# Patient Record
Sex: FEMALE | Race: WHITE | URBAN - METROPOLITAN AREA
[De-identification: names, ages, dates, MRNs, and addresses within clinical notes are randomized per-mention and may not be internally consistent; named-entity substitution may affect disease eponyms.]

---

## 2017-02-02 ENCOUNTER — ONCOLOGY VISIT (OUTPATIENT)
Dept: ONCOLOGY | Facility: CLINIC | Age: 47
End: 2017-02-02
Attending: INTERNAL MEDICINE
Payer: COMMERCIAL

## 2017-02-02 ENCOUNTER — HOSPITAL ENCOUNTER (OUTPATIENT)
Facility: CLINIC | Age: 47
Setting detail: SPECIMEN
Discharge: HOME | End: 2017-02-02
Attending: INTERNAL MEDICINE | Admitting: INTERNAL MEDICINE
Payer: COMMERCIAL

## 2017-02-02 VITALS
HEART RATE: 68 BPM | OXYGEN SATURATION: 97 % | RESPIRATION RATE: 16 BRPM | DIASTOLIC BLOOD PRESSURE: 74 MMHG | TEMPERATURE: 97.6 F | BODY MASS INDEX: 31.48 KG/M2 | SYSTOLIC BLOOD PRESSURE: 116 MMHG | WEIGHT: 184.38 LBS | HEIGHT: 64 IN

## 2017-02-02 DIAGNOSIS — C50.411 MALIGNANT NEOPLASM OF UPPER-OUTER QUADRANT OF RIGHT FEMALE BREAST (H): ICD-10-CM

## 2017-02-02 LAB
ALBUMIN SERPL-MCNC: 3.8 G/DL (ref 3.4–5)
ALP SERPL-CCNC: 69 U/L (ref 40–150)
ALT SERPL W P-5'-P-CCNC: 29 U/L (ref 0–50)
ANION GAP SERPL CALCULATED.3IONS-SCNC: 5 MMOL/L (ref 3–14)
AST SERPL W P-5'-P-CCNC: 24 U/L (ref 0–45)
BILIRUB SERPL-MCNC: 0.7 MG/DL (ref 0.2–1.3)
BUN SERPL-MCNC: 16 MG/DL (ref 7–30)
CALCIUM SERPL-MCNC: 8.8 MG/DL (ref 8.5–10.1)
CANCER AG27-29 SERPL-ACNC: 19 U/ML (ref 0–39)
CHLORIDE SERPL-SCNC: 105 MMOL/L (ref 94–109)
CO2 SERPL-SCNC: 30 MMOL/L (ref 20–32)
CREAT SERPL-MCNC: 0.77 MG/DL (ref 0.52–1.04)
ERYTHROCYTE [DISTWIDTH] IN BLOOD BY AUTOMATED COUNT: 11.8 % (ref 10–15)
GFR SERPL CREATININE-BSD FRML MDRD: 81 ML/MIN/1.7M2
GLUCOSE SERPL-MCNC: 92 MG/DL (ref 70–99)
HCT VFR BLD AUTO: 41.1 % (ref 35–47)
HGB BLD-MCNC: 13.6 G/DL (ref 11.7–15.7)
MCH RBC QN AUTO: 28.3 PG (ref 26.5–33)
MCHC RBC AUTO-ENTMCNC: 33.1 G/DL (ref 31.5–36.5)
MCV RBC AUTO: 86 FL (ref 78–100)
PLATELET # BLD AUTO: 187 10E9/L (ref 150–450)
POTASSIUM SERPL-SCNC: 4 MMOL/L (ref 3.4–5.3)
PROT SERPL-MCNC: 7.4 G/DL (ref 6.8–8.8)
RBC # BLD AUTO: 4.8 10E12/L (ref 3.8–5.2)
SODIUM SERPL-SCNC: 140 MMOL/L (ref 133–144)
WBC # BLD AUTO: 5.4 10E9/L (ref 4–11)

## 2017-02-02 PROCEDURE — 99213 OFFICE O/P EST LOW 20 MIN: CPT | Performed by: INTERNAL MEDICINE

## 2017-02-02 PROCEDURE — 36415 COLL VENOUS BLD VENIPUNCTURE: CPT

## 2017-02-02 PROCEDURE — 86300 IMMUNOASSAY TUMOR CA 15-3: CPT | Performed by: INTERNAL MEDICINE

## 2017-02-02 PROCEDURE — 85027 COMPLETE CBC AUTOMATED: CPT | Performed by: INTERNAL MEDICINE

## 2017-02-02 PROCEDURE — 80053 COMPREHEN METABOLIC PANEL: CPT | Performed by: INTERNAL MEDICINE

## 2017-02-02 PROCEDURE — 99211 OFF/OP EST MAY X REQ PHY/QHP: CPT

## 2017-02-02 RX ORDER — TAMOXIFEN CITRATE 20 MG/1
20 TABLET ORAL DAILY
Qty: 90 TABLET | Refills: 1 | Status: SHIPPED | OUTPATIENT
Start: 2017-02-02 | End: 2017-11-02 | Stop reason: ALTCHOICE

## 2017-02-02 ASSESSMENT — PAIN SCALES - GENERAL: PAINLEVEL: NO PAIN (0)

## 2017-02-02 NOTE — PROGRESS NOTES
HISTORY OF PRESENT ILLNESS:  Ashleigh Gandhi, previously known as Ashleigh Jaimes, is a 46-year-old patient who comes in today for interim followup.  She was diagnosed with a T1 N1 tumor of the right breast in 05/2012 and she is here today for interim followup of that.  Ashleigh will be 5 years out from her diagnosis in 05/2017.  She actually had 2 small tumors in the right breast with microscopic disease in a sentinel lymph node, ER/AL positive, HER-2 receptor negative.  She finished off chemotherapy and has been on tamoxifen now for approximately 4-1/2 years.  She is due to stop the tamoxifen in September.  She is status post bilateral mastectomies.  She has been doing well on the tamoxifen.  We have talked about 5 years versus 10 years today and when she comes back to see me in the fall, we are going to consider switching her over to an aromatase inhibitor with Aromasin.  I have discussed this with her today.  She is in agreement with the plan and will get a bone density before she comes back.  Unfortunately, since I saw her last, she has got  but she seems to be very happy.  She works full-time as a  and she has a busy lifestyle.      REVIEW OF SYSTEMS:  A 14-point comprehensive review of systems today is otherwise unremarkable.  She does terrell with her weight and she is on an exercise program.      MEDICATIONS:  As charted.      ALLERGIES:  As charted.      PHYSICAL EXAMINATION:   VITAL SIGNS:  Stable.   HEENT:  Oropharynx clear, mucous membranes moist.   NECK:  Has no masses or goiter.   LYMPH:  There is no cervical, supraclavicular or infraclavicular adenopathy.   CHEST:  Clear to auscultation and percussion bilaterally.   HEART:  S1, S2 normal.  No added sounds or murmurs.   ABDOMEN:  Soft and nontender, no hepatosplenomegaly.   EXTREMITIES:  Legs are without tenderness or edema.   BREASTS:  The patient is status post bilateral mastectomies, scars look good.  Right and left axilla are negative.   No palpable masses on the chest wall.   SKIN:  Has no rashes or lesions.   LYMPHATIC:  No evidence of lymphedema.      DATA REVIEW:  Labs are pending at time of dictation.      ASSESSMENT AND PLAN:  The patient with a history of a T2 N1 right-sided breast cancer.  She had 2 small tumors on the right breast with microscopic disease in a sentinel lymph node, ER/RI positive.  She will finish out tamoxifen in the  of .  She will see me once she has finished the tamoxifen with a bone density test.  If her bone density test is decent, we will consider putting her on Aromasin.  I have had this discussion with her today and she is in agreement with the plan.         PJ DUPREE MD             D: 2017 14:20   T: 2017 14:34   MT: WILLI      Name:     DEREK GIORDANO   MRN:      [id number]        Account:      [id number]   :      1970           Visit Date:   2017      Document: [id number]

## 2017-02-02 NOTE — PROGRESS NOTES
"Ashleigh Gandhi is a 46 year old female who presents for:  Chief Complaint   Patient presents with     Oncology Clinic Visit     Follow up        Initial Vitals:  Ht 1.626 m (5' 4\")  Wt 83.632 kg (184 lb 6 oz)  BMI 31.63 kg/m2 Estimated body mass index is 31.63 kg/(m^2) as calculated from the following:    Height as of this encounter: 1.626 m (5' 4\").    Weight as of this encounter: 83.632 kg (184 lb 6 oz).. Body surface area is 1.94 meters squared. BP completed using cuff size: regular  No Pain (0) No LMP recorded. Allergies and medications reviewed.     Medications: Medication refills not needed today.  Pharmacy name entered into [employer]: [id number] [place], [place] - [facility]    Comments: Follow up    8 minutes for nursing intake (face to face time)   Ashleigh Rothman CMA     DISCHARGE PLAN:  Next appointments: See patient instruction section  Departure Mode: Ambulatory  Accompanied by: self  0 minutes for nursing discharge (face to face time)   Ashleigh Rothman CMA              "

## 2017-02-02 NOTE — MR AVS SNAPSHOT
After Visit Summary   2/2/2017    Ashleigh Gandhi    MRN: [id number]           Patient Information     Date Of Birth          1970        Visit Information        Provider Department      2/2/2017 12:30 PM Donald Garvin MD [facility]        Today's Diagnoses     Malignant neoplasm of upper-outer quadrant of right female breast (H)           Care Instructions    RTC MD  Oct 2017 Scheduled 9/13/17 Neida KEYS      Labs today-Ashleigh and on return     Bone density in Sept 2017   Scheduled 9/6/17 Neida KEYS  AVS given to patient          Follow-ups after your visit        Your next 10 appointments already scheduled     Sep 06, 2017 10:00 AM   DX HIP[facility]PELVIS[facility]SPINE [facility]    [address]  [address]  [facility] [address]              Please do not take any of the following 48 hours prior to your exam: vitamins, calcium tablets, antacids.            Sep 13, 2017 12:30 PM   Return Visit with Donald Garvin MD   [facility] ([facility])    [facility]  [address]  [address]  [facility] [id number] [phone]              Future tests that were ordered for you today     Open Future Orders        Priority Expected Expires Ordered    DX Hip/Pelvis/Spine Routine 9/13/2017 2/2/2018 2/2/2017            Who to contact     If you have questions or need follow up information about today's clinic visit or your schedule please contact [facility] directly at [phone].  Normal or non-critical lab and imaging results will be communicated to you by MyChart, letter or phone within 4 business days after the clinic has received the results. If you do not hear from us within 7 days, please contact the clinic through MyChart or phone. If you have a critical or abnormal lab result, we will notify you by phone as  "soon as possible.  Submit refill requests through [employer] or call your pharmacy and they will forward the refill request to us. Please allow 3 business days for your refill to be completed.          Additional Information About Your Visit        [employer] Information     Nobl lets you send messages to your doctor, view your test results, renew your prescriptions, schedule appointments and more. To sign up, go to [email or web address] . Click on \"Log in\" on the left side of the screen, which will take you to the Welcome page. Then click on \"Sign up Now\" on the right side of the page.     You will be asked to enter the access code listed below, as well as some personal information. Please follow the directions to create your username and password.     Your access code is: [email or web address]  Expires: 5/3/2017  1:44 PM     Your access code will  in 90 days. If you need help or a new code, please call your [place] clinic or [phone].        Care EveryWhere ID     This is your Care EveryWhere ID. This could be used by other organizations to access your [place] medical records  [id number]        Your Vitals Were     Pulse Temperature Respirations Height BMI (Body Mass Index) Pulse Oximetry    68 97.6  F (36.4  C) (Tympanic) 16 1.626 m (5' 4\") 31.63 kg/m2 97%       Blood Pressure from Last 3 Encounters:   17 116/74   11/23/15 108/69    Weight from Last 3 Encounters:   17 83.632 kg (184 lb 6 oz)   11/23/15 82.101 kg (181 lb)              We Performed the Following     Ca27.29  breast tumor marker     CBC with platelets     Comprehensive metabolic panel (BMP + Alb, Alk Phos, ALT, AST, Total. Bili, TP)          Today's Medication Changes          These changes are accurate as of: 17  1:44 PM.  If you have any questions, ask your nurse or doctor.               These medicines have changed or have updated prescriptions.        Dose/Directions    * TAMOXIFEN CITRATE PO   This may have changed:  Another " medication with the same name was added. Make sure you understand how and when to take each.        Dose:  10 mg   Take 10 mg by mouth daily   Refills:  0       * tamoxifen 20 MG tablet   Commonly known as:  NOLVADEX   This may have changed:  You were already taking a medication with the same name, and this prescription was added. Make sure you understand how and when to take each.   Used for:  Malignant neoplasm of upper-outer quadrant of right female breast (H)        Dose:  20 mg   Take 1 tablet (20 mg) by mouth daily   Quantity:  90 tablet   Refills:  1       * Notice:  This list has 2 medication(s) that are the same as other medications prescribed for you. Read the directions carefully, and ask your doctor or other care provider to review them with you.      Stop taking these medicines if you haven't already. Please contact your care team if you have questions.     cycloSPORINE 0.05 % ophthalmic emulsion   Commonly known as:  RESTASIS                Where to get your medicines      These medications were sent to [facility]/pharmacy #[id number] - [place], [place] - [address]  [address], [place] [address]     Phone:  [phone]    - tamoxifen 20 MG tablet             Primary Care Provider    None Specified       No primary provider on file.        Thank you!     Thank you for choosing [facility]  for your care. Our goal is always to provide you with excellent care. Hearing back from our patients is one way we can continue to improve our services. Please take a few minutes to complete the written survey that you may receive in the mail after your visit with us. Thank you!             Your Updated Medication List - Protect others around you: Learn how to safely use, store and throw away your medicines at www.disposemymeds.org.          This list is accurate as of: 2/2/17  1:44 PM.  Always use your most recent med list.                   Brand Name Dispense Instructions for use    *  TAMOXIFEN CITRATE PO      Take 10 mg by mouth daily       * tamoxifen 20 MG tablet    NOLVADEX    90 tablet    Take 1 tablet (20 mg) by mouth daily       * Notice:  This list has 2 medication(s) that are the same as other medications prescribed for you. Read the directions carefully, and ask your doctor or other care provider to review them with you.

## 2017-02-02 NOTE — PATIENT INSTRUCTIONS
RTC MD  Oct 2017 Scheduled 9/13/17 Neida KEYS      Labs today-Ashleigh and on return     Bone density in Sept 2017   Scheduled 9/6/17 Neida KEYS  AVS given to patient

## 2017-11-02 ENCOUNTER — ONCOLOGY VISIT (OUTPATIENT)
Dept: ONCOLOGY | Facility: CLINIC | Age: 47
End: 2017-11-02
Attending: INTERNAL MEDICINE
Payer: COMMERCIAL

## 2017-11-02 ENCOUNTER — HOSPITAL ENCOUNTER (OUTPATIENT)
Facility: CLINIC | Age: 47
Setting detail: SPECIMEN
Discharge: HOME | End: 2017-11-02
Attending: INTERNAL MEDICINE | Admitting: INTERNAL MEDICINE
Payer: COMMERCIAL

## 2017-11-02 VITALS
DIASTOLIC BLOOD PRESSURE: 81 MMHG | WEIGHT: 191.2 LBS | BODY MASS INDEX: 32.64 KG/M2 | TEMPERATURE: 98.5 F | HEIGHT: 64 IN | OXYGEN SATURATION: 99 % | SYSTOLIC BLOOD PRESSURE: 118 MMHG | HEART RATE: 69 BPM | RESPIRATION RATE: 16 BRPM

## 2017-11-02 DIAGNOSIS — C50.911 MALIGNANT NEOPLASM OF RIGHT BREAST IN FEMALE, ESTROGEN RECEPTOR POSITIVE, UNSPECIFIED SITE OF BREAST (H): Primary | ICD-10-CM

## 2017-11-02 DIAGNOSIS — Z17.0 MALIGNANT NEOPLASM OF RIGHT BREAST IN FEMALE, ESTROGEN RECEPTOR POSITIVE, UNSPECIFIED SITE OF BREAST (H): Primary | ICD-10-CM

## 2017-11-02 LAB
ALBUMIN SERPL-MCNC: 4.1 G/DL (ref 3.4–5)
ALP SERPL-CCNC: 88 U/L (ref 40–150)
ALT SERPL W P-5'-P-CCNC: 39 U/L (ref 0–50)
ANION GAP SERPL CALCULATED.3IONS-SCNC: 4 MMOL/L (ref 3–14)
AST SERPL W P-5'-P-CCNC: 24 U/L (ref 0–45)
BILIRUB SERPL-MCNC: 0.4 MG/DL (ref 0.2–1.3)
BUN SERPL-MCNC: 12 MG/DL (ref 7–30)
CALCIUM SERPL-MCNC: 9.4 MG/DL (ref 8.5–10.1)
CANCER AG27-29 SERPL-ACNC: 20 U/ML (ref 0–39)
CHLORIDE SERPL-SCNC: 101 MMOL/L (ref 94–109)
CO2 SERPL-SCNC: 34 MMOL/L (ref 20–32)
CREAT SERPL-MCNC: 0.78 MG/DL (ref 0.52–1.04)
ERYTHROCYTE [DISTWIDTH] IN BLOOD BY AUTOMATED COUNT: 11.9 % (ref 10–15)
GFR SERPL CREATININE-BSD FRML MDRD: 79 ML/MIN/1.7M2
GLUCOSE SERPL-MCNC: 95 MG/DL (ref 70–99)
HCT VFR BLD AUTO: 42.8 % (ref 35–47)
HGB BLD-MCNC: 14.6 G/DL (ref 11.7–15.7)
MCH RBC QN AUTO: 28.7 PG (ref 26.5–33)
MCHC RBC AUTO-ENTMCNC: 34.1 G/DL (ref 31.5–36.5)
MCV RBC AUTO: 84 FL (ref 78–100)
PLATELET # BLD AUTO: 211 10E9/L (ref 150–450)
POTASSIUM SERPL-SCNC: 4 MMOL/L (ref 3.4–5.3)
PROT SERPL-MCNC: 8.2 G/DL (ref 6.8–8.8)
RBC # BLD AUTO: 5.08 10E12/L (ref 3.8–5.2)
SODIUM SERPL-SCNC: 139 MMOL/L (ref 133–144)
WBC # BLD AUTO: 6 10E9/L (ref 4–11)

## 2017-11-02 PROCEDURE — 99214 OFFICE O/P EST MOD 30 MIN: CPT | Performed by: INTERNAL MEDICINE

## 2017-11-02 PROCEDURE — 85027 COMPLETE CBC AUTOMATED: CPT | Performed by: INTERNAL MEDICINE

## 2017-11-02 PROCEDURE — 86300 IMMUNOASSAY TUMOR CA 15-3: CPT | Performed by: INTERNAL MEDICINE

## 2017-11-02 PROCEDURE — 99211 OFF/OP EST MAY X REQ PHY/QHP: CPT

## 2017-11-02 PROCEDURE — 80053 COMPREHEN METABOLIC PANEL: CPT | Performed by: INTERNAL MEDICINE

## 2017-11-02 RX ORDER — EXEMESTANE 25 MG/1
25 TABLET ORAL DAILY
Qty: 90 TABLET | Refills: 3 | Status: SHIPPED | OUTPATIENT
Start: 2017-11-02 | End: 2017-11-22

## 2017-11-02 ASSESSMENT — PAIN SCALES - GENERAL: PAINLEVEL: MODERATE PAIN (4)

## 2017-11-02 NOTE — PROGRESS NOTES
DATE OF SERVICE:  11/02/2017      HISTORY OF PRESENT ILLNESS:  Ms. Ashleigh Gandhi is a 46-year-old patient who comes in today for interim followup.  She is here today for followup of a right-sided breast cancer as she was diagnosed with a T1, N1 right-sided breast cancer in 05/2012.  She is now 5 years out from that diagnosis.  She had 2 small tumors in the right breast with microscopic disease in a sentinel lymph node, ER/TX positive, HER-2 receptor negative.  She had chemotherapy and then 5 years of tamoxifen, which she is just about to stop.  She is status post bilateral mastectomies.  We have discussed options with her.  One option is continuing the tamoxifen for 10 years.  The other option is switching over to an aromatase inhibitor as she is postmenopausal with Aromasin and the other option is not doing anything further.  We have elected to start her on Aromasin.  We have discussed the risks, benefits and side effects today of the Aromasin.  She did have a bone density done which was good and I have reviewed the bone density with her today.  A comprehensive 14-point review of systems is otherwise unremarkable.  She continues to terrell with some weight and she is on an exercise program.  She works full-time as a .      MEDICATIONS:  As charted.      ALLERGIES:  As charted.      PHYSICAL EXAMINATION:   GENERAL:  She is well-appearing lady in no acute distress.   VITAL SIGNS:  Stable.   HEENT:  Oropharynx clear, mucous membranes moist.   NECK:  Has no masses or goiter.   LYMPH:  There is no cervical, supraclavicular or infraclavicular adenopathy.   CHEST:  Clear to auscultation and percussion bilaterally.   HEART:  S1, S2 normal.  No added sounds or murmurs.   ABDOMEN:  Soft and nontender, no hepatosplenomegaly.   EXTREMITIES:  Legs are without tenderness or edema.   BREASTS:  The patient is status post bilateral mastectomies, scars look good.  Right and left axillae are negative.  No palpable masses on the  chest wall.   SKIN:  Has no rashes or lesions.   LYMPHATIC:  No evidence of lymphedema.      DATA REVIEW:  Labs are pending at time of dictation.      IMPRESSION:  Patient with a history of a T2 N1 right-sided breast cancer.  She is finishing off tamoxifen in the next couple of days.  We want to start her on Aromasin.  I have discussed the risks, benefits and side effects of the Aromasin today and I have reviewed her bone density.  The patient understands the risk and is willing to proceed.  I will see her back for further review in 6 months.  She will call me if she has got any problems with the Aromasin.         PJ DUPREE MD             D: 2017 12:12   T: 2017 12:40   MT:       Name:     DEREK GIORDANO   MRN:      [id number]        Account:      [id number]   :      1970           Visit Date:   2017      Document: [id number]

## 2017-11-02 NOTE — MR AVS SNAPSHOT
"              After Visit Summary   11/2/2017    Ashleigh Gandhi    MRN: [id number]           Patient Information     Date Of Birth          1970        Visit Information        Provider Department      11/2/2017 10:30 AM Donald Garvin MD [facility]      Today's Diagnoses     Malignant neoplasm of right breast in female, estrogen receptor positive, unspecified site of breast (H)    -  1      Care Instructions      RTC MD   6 months       Labs today and on return  -Yasmeen            Follow-ups after your visit        Your next 10 appointments already scheduled     May 03, 2018 12:30 PM CDT   Return Visit with Donald Garvin MD   [facility] ([facility])    [facility]  [address]  [facility] [address]  [facility] [address] [phone]              Who to contact     If you have questions or need follow up information about today's clinic visit or your schedule please contact [facility] directly at [phone].  Normal or non-critical lab and imaging results will be communicated to you by LocalSensehart, letter or phone within 4 business days after the clinic has received the results. If you do not hear from us within 7 days, please contact the clinic through [employer] or phone. If you have a critical or abnormal lab result, we will notify you by phone as soon as possible.  Submit refill requests through [employer] or call your pharmacy and they will forward the refill request to us. Please allow 3 business days for your refill to be completed.          Additional Information About Your Visit        LocalSenseharBookShout! Information     [employer] lets you send messages to your doctor, view your test results, renew your prescriptions, schedule appointments and more. To sign up, go to [email or web address] . Click on \"Log in\" on the left side of the screen, which will take you to the Welcome page. " "Then click on \"Sign up Now\" on the right side of the page.     You will be asked to enter the access code listed below, as well as some personal information. Please follow the directions to create your username and password.     Your access code is: [email or web address]  Expires: 2018  6:31 AM     Your access code will  in 90 days. If you need help or a new code, please call your [place] clinic or [phone].        Care EveryWhere ID     This is your Care EveryWhere ID. This could be used by other organizations to access your [place] medical records  [id number]        Your Vitals Were     Pulse Temperature Respirations Height Pulse Oximetry BMI (Body Mass Index)    69 98.5  F (36.9  C) (Tympanic) 16 1.626 m (5' 4\") 99% 32.82 kg/m2       Blood Pressure from Last 3 Encounters:   17 118/81   17 116/74   11/23/15 108/69    Weight from Last 3 Encounters:   17 86.7 kg (191 lb 3.2 oz)   17 83.6 kg (184 lb 6 oz)   11/23/15 82.1 kg (181 lb)              We Performed the Following     Ca27.29  breast tumor marker     CBC with platelets     Comprehensive metabolic panel (BMP + Alb, Alk Phos, ALT, AST, Total. Bili, TP)          Today's Medication Changes          These changes are accurate as of: 17 11:34 AM.  If you have any questions, ask your nurse or doctor.               Start taking these medicines.        Dose/Directions    exemestane 25 MG tablet   Commonly known as:  AROMASIN   Used for:  Malignant neoplasm of right breast in female, estrogen receptor positive, unspecified site of breast (H)   Started by:  Donald Garvin MD        Dose:  25 mg   Take 1 tablet (25 mg) by mouth daily   Quantity:  90 tablet   Refills:  3         Stop taking these medicines if you haven't already. Please contact your care team if you have questions.     tamoxifen 20 MG tablet   Commonly known as:  NOLVADEX   Stopped by:  Donald Garvin MD                Where to get your medicines "      These medications were sent to [facility]/pharmacy #[id number] - [place], [place] - [address]  [address] [facility], [place] [address]     Phone:  [phone]     exemestane 25 MG tablet                Primary Care Provider Office Phone # Fax #    Soraya Coronado [phone]       [facility] [address] [address]        Equal Access to Services     JYOTI [facility] HospitalENRIQUETA : Hadii aad ku hadasho Soomaali, waaxda luqadaha, qaybta kaalmada adeegyada, waxay idiin hayaan adeeg khmainorsh lasherry . So [facility] [phone].    ATENCIÓN: Si jaymie guillen, tiene a bennett disposición servicios gratuitos de asistencia lingüística. [facility] [phone].    We comply with applicable federal civil rights laws and [place] laws. We do not discriminate on the basis of race, color, national origin, age, disability, sex, sexual orientation, or gender identity.            Thank you!     Thank you for choosing [facility]  for your care. Our goal is always to provide you with excellent care. Hearing back from our patients is one way we can continue to improve our services. Please take a few minutes to complete the written survey that you may receive in the mail after your visit with us. Thank you!             Your Updated Medication List - Protect others around you: Learn how to safely use, store and throw away your medicines at www.disposemymeds.org.          This list is accurate as of: 11/2/17 11:34 AM.  Always use your most recent med list.                   Brand Name Dispense Instructions for use Diagnosis    exemestane 25 MG tablet    AROMASIN    90 tablet    Take 1 tablet (25 mg) by mouth daily    Malignant neoplasm of right breast in female, estrogen receptor positive, unspecified site of breast (H)

## 2017-11-02 NOTE — LETTER
11/2/2017         RE: Ashleigh Gandhi  [address] [place] [address]        Dear Colleague,    Thank you for referring your patient, Ashleigh Gandhi, to the [facility]. Please see a copy of my visit note below.    DATE OF SERVICE:  11/02/2017      HISTORY OF PRESENT ILLNESS:  Ms. Ashleigh Gandhi is a 46-year-old patient who comes in today for interim followup.  She is here today for followup of a right-sided breast cancer as she was diagnosed with a T1, N1 right-sided breast cancer in 05/2012.  She is now 5 years out from that diagnosis.  She had 2 small tumors in the right breast with microscopic disease in a sentinel lymph node, ER/DE positive, HER-2 receptor negative.  She had chemotherapy and then 5 years of tamoxifen, which she is just about to stop.  She is status post bilateral mastectomies.  We have discussed options with her.  One option is continuing the tamoxifen for 10 years.  The other option is switching over to an aromatase inhibitor as she is postmenopausal with Aromasin and the other option is not doing anything further.  We have elected to start her on Aromasin.  We have discussed the risks, benefits and side effects today of the Aromasin.  She did have a bone density done which was good and I have reviewed the bone density with her today.  A comprehensive 14-point review of systems is otherwise unremarkable.  She continues to terrell with some weight and she is on an exercise program.  She works full-time as a .      MEDICATIONS:  As charted.      ALLERGIES:  As charted.      PHYSICAL EXAMINATION:   GENERAL:  She is well-appearing lady in no acute distress.   VITAL SIGNS:  Stable.   HEENT:  Oropharynx clear, mucous membranes moist.   NECK:  Has no masses or goiter.   LYMPH:  There is no cervical, supraclavicular or infraclavicular adenopathy.   CHEST:  Clear to auscultation and percussion bilaterally.   HEART:  S1, S2 normal.  No added sounds or murmurs.    ABDOMEN:  Soft and nontender, no hepatosplenomegaly.   EXTREMITIES:  Legs are without tenderness or edema.   BREASTS:  The patient is status post bilateral mastectomies, scars look good.  Right and left axillae are negative.  No palpable masses on the chest wall.   SKIN:  Has no rashes or lesions.   LYMPHATIC:  No evidence of lymphedema.      DATA REVIEW:  Labs are pending at time of dictation.      IMPRESSION:  Patient with a history of a T2 N1 right-sided breast cancer.  She is finishing off tamoxifen in the next couple of days.  We want to start her on Aromasin.  I have discussed the risks, benefits and side effects of the Aromasin today and I have reviewed her bone density.  The patient understands the risk and is willing to proceed.  I will see her back for further review in 6 months.  She will call me if she has got any problems with the Aromasin.         PJ GARVIN MD             D: 2017 12:12   T: 2017 12:40   MT:       Name:     DEREK GIORDANO   MRN:      [id number]        Account:      [id number]   :      1970           Visit Date:   2017      Document: [id number]       Again, thank you for allowing me to participate in the care of your patient.        Sincerely,        Pj Garvin MD

## 2017-11-02 NOTE — NURSING NOTE
"Oncology Rooming Note    November 2, 2017 10:22 AM   Ashleigh Gandhi is a 46 year old female who presents for:    Chief Complaint   Patient presents with     Oncology Clinic Visit     Follow-up     Initial Vitals: Ht 1.626 m (5' 4\")  Wt 86.7 kg (191 lb 3.2 oz)  BMI 32.82 kg/m2 Estimated body mass index is 32.82 kg/(m^2) as calculated from the following:    Height as of this encounter: 1.626 m (5' 4\").    Weight as of this encounter: 86.7 kg (191 lb 3.2 oz). Body surface area is 1.98 meters squared.  Moderate Pain (4) Comment: Low back and Left knee   No LMP recorded.  Allergies reviewed: Yes  Medications reviewed: Yes    Medications: Medication refills not needed today.  Pharmacy name entered into [employer]:    [id number] [place], [place] - [address] [facility]  [id number] - [place], [place] - [address]    Clinical concerns: Follow-up     8 minutes for nursing intake (face to face time)     DISCHARGE PLAN:  Next appointments: See patient instruction section  Departure Mode: Ambulatory  Accompanied by: self  0 minutes for nursing discharge (face to face time)   Yasmeen Yarbrough                "

## 2017-11-09 ENCOUNTER — TELEPHONE (OUTPATIENT)
Dept: ONCOLOGY | Facility: CLINIC | Age: 47
End: 2017-11-09

## 2017-11-09 NOTE — TELEPHONE ENCOUNTER
Called patient and left voicemail message, letting her know that her recent Ca 27-29 tumor marker test came back normal per Dr. Garvin.

## 2017-11-22 ENCOUNTER — TELEPHONE (OUTPATIENT)
Dept: ONCOLOGY | Facility: CLINIC | Age: 47
End: 2017-11-22

## 2017-11-22 DIAGNOSIS — C50.911 MALIGNANT NEOPLASM OF RIGHT BREAST IN FEMALE, ESTROGEN RECEPTOR POSITIVE, UNSPECIFIED SITE OF BREAST (H): ICD-10-CM

## 2017-11-22 DIAGNOSIS — Z17.0 MALIGNANT NEOPLASM OF RIGHT BREAST IN FEMALE, ESTROGEN RECEPTOR POSITIVE, UNSPECIFIED SITE OF BREAST (H): ICD-10-CM

## 2017-11-22 RX ORDER — EXEMESTANE 25 MG/1
25 TABLET ORAL DAILY
Qty: 90 TABLET | Refills: 3 | Status: SHIPPED | OUTPATIENT
Start: 2017-11-22 | End: 2018-05-21

## 2017-11-22 NOTE — TELEPHONE ENCOUNTER
Pt calling in.  States that her Rx for exemestane from 11/2/2017 was sent in to [facility] pharmacy in [facility].  However, her insurance company states that she cannot fill this medication at the [facility] pharmacy.  Pt states that it must be filled by [facility] (phone [phone]).  Pt is requesting that the Rx be sent there.  Called [facility] pharmacy. They state that the Rx can actually be filled through [facility], their parent company.  Rx for exemestane has been sent to [facility] per previous orders from 11/2/2017.  Left a detailed message for pt with all of this information.  Call the clinic with any further questions or concerns.  Joyce Hernandes RN BSN

## 2017-11-30 ENCOUNTER — CARE COORDINATION (OUTPATIENT)
Dept: ONCOLOGY | Facility: CLINIC | Age: 47
End: 2017-11-30

## 2017-11-30 NOTE — PROGRESS NOTES
Received fax from [employer].  Requesting clarification as to why pt is taking an aromatase inhibitor when she is under the age of 50.  Per Dr Garvin, pt is post-menopausal, and it is appropriate for her to be taking an aromatase inhibitor. Form has been completed and faxed back to [employer] at [phone].  Form has been sent to [employer] for scanning.  Joyce Hernandes RN BSN

## 2017-12-01 ENCOUNTER — TELEPHONE (OUTPATIENT)
Dept: ONCOLOGY | Facility: CLINIC | Age: 47
End: 2017-12-01

## 2017-12-01 NOTE — TELEPHONE ENCOUNTER
Pt called and states that [facility] cancelled her Rx because they never received the information requested. They are requesting a new Rx sent electronically not faxed. Please advise.

## 2017-12-04 NOTE — TELEPHONE ENCOUNTER
Talked with Sarah at [facility].  Sarah states that they did cancel the Rx for exemestane because they did not receive a reply to their fax regarding the safety of someone under the age of 50 taking an aromatase inhibitor.   Advised Sarah that the requested information was faxed to their office on 11/30/2017.  Per Dr Garvin, pt is post-menopausal and it is very appropriate for her to be taking exemestane.    Sarah states that she will activate the Rx for exemestane that was cancelled right now.  Will be processed and shipped out in a few days. Sarah states that if pt needs the Rx expedited, pt can call [phone] to have it expedited.   Left a message for pt to call back to discuss.  Joyce Hernandes RN BSN

## 2017-12-05 NOTE — TELEPHONE ENCOUNTER
Pt notified. Pt states that she will call [facility] now to make sure that the Rx for exemestane is being processed and will be shipped out to her soon. Patient verbalized understanding and agreement with plan.  Pt was instructed to call the clinic with any questions, concerns, or worsening symptoms.   Joyce Hernandes RN BSN

## 2018-03-23 ENCOUNTER — TELEPHONE (OUTPATIENT)
Dept: ONCOLOGY | Facility: CLINIC | Age: 48
End: 2018-03-23

## 2018-03-26 NOTE — TELEPHONE ENCOUNTER
David in pharmacy states that he has talked to pt.  David is working with pt to review possible sources of assistance, which is pending at this time.    Joyce Hernandes RN BSN

## 2018-05-07 ENCOUNTER — CARE COORDINATION (OUTPATIENT)
Dept: ONCOLOGY | Facility: CLINIC | Age: 48
End: 2018-05-07

## 2018-05-07 NOTE — LETTER
"    May 7, 2018       TO: Ashleigh Oksana  [address] [place] [address]         Dear Ms. Gandhi,    We missed you at your last appointment at [facility]. We have several options to help you reschedule your appointment:      Call [phone]    Visit our website at [email or web address] and click \"I Want To\" (In Upper Right) and select Request an Appointment    Request an appointment via Last Guide at [email or web address]    We are committed to providing you with exceptional care and service. It's important that our patients can get appointments when they need them, so we ask that you make every effort to consistently attend scheduled appointments and give us notice when you need to cancel an appointment.    If financial concerns have kept you from your appointment, we want to help. Please call a financial counselor at [phone] to assist you.      Sincerely,      [facility]  "

## 2018-05-07 NOTE — PROGRESS NOTES
Pt did not come to her scheduled appt on 5/3/2018.  No show letter has been printed and mailed to pt.  Joyce Hernandes RN BSN

## 2018-05-21 DIAGNOSIS — C50.911 MALIGNANT NEOPLASM OF RIGHT BREAST IN FEMALE, ESTROGEN RECEPTOR POSITIVE, UNSPECIFIED SITE OF BREAST (H): ICD-10-CM

## 2018-05-21 DIAGNOSIS — Z17.0 MALIGNANT NEOPLASM OF RIGHT BREAST IN FEMALE, ESTROGEN RECEPTOR POSITIVE, UNSPECIFIED SITE OF BREAST (H): ICD-10-CM

## 2018-05-21 NOTE — TELEPHONE ENCOUNTER
Pending Prescriptions:                       Disp   Refills    exemestane (AROMASIN) 25 MG tablet        90 tab*3            Sig: Take 1 tablet (25 mg) by mouth daily         Last Written Prescription Date:  11/22/2017  Last Fill Quantity: 90,   # refills: 3  Last Office Visit: 11/2/2017  Future Office visit:   Pt had an appt scheduled with Dr Garvin on 5/3/2018, but she did not come to the appt.  No future appt scheduled.  Left a message for pt to call back to discuss.    Also talked with [facility].  They state that pt had the previous Rx from 11/22/2017 transferred to a different pharmacy, so they will need a new Rx.  Will await return call from pt.  Joyce Hernandes RN BSN

## 2018-05-23 RX ORDER — EXEMESTANE 25 MG/1
25 TABLET ORAL DAILY
Qty: 90 TABLET | Refills: 3 | Status: SHIPPED | OUTPATIENT
Start: 2018-05-23 | End: 2018-12-12 | Stop reason: SINTOL

## 2018-05-23 NOTE — TELEPHONE ENCOUNTER
Talked with pt.  Pt states that she wants a refill sent to [facility] for the exemestane.  Pt has met her deductible for her insurance, and states that she will be able to get the exemestane at no charge for the remainder of the year. However, pt states that she does want to discuss with Dr Garvin at her next appt to see if there would be a cheaper alternative for medication for next year. The exemestane costs $300 per month.  Pt also states that she missed her appt with Dr Garvin and would like to reschedule the appt.  Pt transferred to scheduling. Appt has been scheduled on 6/13/2018. Patient verbalized understanding and agreement with plan.  Pt was instructed to call the clinic with any questions, concerns, or worsening symptoms.  Will route to Dr Garvin for refill request.  Joyce Hernandes RN BSN

## 2018-05-23 NOTE — TELEPHONE ENCOUNTER
Signed Prescriptions:                        Disp   Refills    exemestane (AROMASIN) 25 MG tablet         90 tab*3        Sig: Take 1 tablet (25 mg) by mouth daily  Authorizing Provider: PJ GARVIN      Rx has been approved by Dr Garvin.  Joyce Hernandes RN BSN

## 2018-06-13 ENCOUNTER — HOSPITAL ENCOUNTER (OUTPATIENT)
Facility: CLINIC | Age: 48
Setting detail: SPECIMEN
Discharge: HOME | End: 2018-06-13
Attending: INTERNAL MEDICINE | Admitting: INTERNAL MEDICINE
Payer: COMMERCIAL

## 2018-06-13 ENCOUNTER — ONCOLOGY VISIT (OUTPATIENT)
Dept: ONCOLOGY | Facility: CLINIC | Age: 48
End: 2018-06-13
Attending: INTERNAL MEDICINE
Payer: COMMERCIAL

## 2018-06-13 VITALS
SYSTOLIC BLOOD PRESSURE: 108 MMHG | DIASTOLIC BLOOD PRESSURE: 72 MMHG | RESPIRATION RATE: 16 BRPM | TEMPERATURE: 97.8 F | WEIGHT: 190 LBS | BODY MASS INDEX: 32.44 KG/M2 | HEART RATE: 81 BPM | HEIGHT: 64 IN | OXYGEN SATURATION: 97 %

## 2018-06-13 DIAGNOSIS — Z17.0 MALIGNANT NEOPLASM OF UPPER-OUTER QUADRANT OF RIGHT BREAST IN FEMALE, ESTROGEN RECEPTOR POSITIVE (H): Primary | ICD-10-CM

## 2018-06-13 DIAGNOSIS — C50.411 MALIGNANT NEOPLASM OF UPPER-OUTER QUADRANT OF RIGHT BREAST IN FEMALE, ESTROGEN RECEPTOR POSITIVE (H): Primary | ICD-10-CM

## 2018-06-13 LAB
ALBUMIN SERPL-MCNC: 4.2 G/DL (ref 3.4–5)
ALP SERPL-CCNC: 101 U/L (ref 40–150)
ALT SERPL W P-5'-P-CCNC: 48 U/L (ref 0–50)
ANION GAP SERPL CALCULATED.3IONS-SCNC: 4 MMOL/L (ref 3–14)
AST SERPL W P-5'-P-CCNC: 22 U/L (ref 0–45)
BILIRUB SERPL-MCNC: 0.3 MG/DL (ref 0.2–1.3)
BUN SERPL-MCNC: 12 MG/DL (ref 7–30)
CALCIUM SERPL-MCNC: 9.2 MG/DL (ref 8.5–10.1)
CANCER AG27-29 SERPL-ACNC: 19 U/ML (ref 0–39)
CHLORIDE SERPL-SCNC: 105 MMOL/L (ref 94–109)
CO2 SERPL-SCNC: 30 MMOL/L (ref 20–32)
CREAT SERPL-MCNC: 0.86 MG/DL (ref 0.52–1.04)
ERYTHROCYTE [DISTWIDTH] IN BLOOD BY AUTOMATED COUNT: 12.1 % (ref 10–15)
GFR SERPL CREATININE-BSD FRML MDRD: 70 ML/MIN/1.7M2
GLUCOSE SERPL-MCNC: 96 MG/DL (ref 70–99)
HCT VFR BLD AUTO: 41.4 % (ref 35–47)
HGB BLD-MCNC: 13.8 G/DL (ref 11.7–15.7)
MCH RBC QN AUTO: 28.2 PG (ref 26.5–33)
MCHC RBC AUTO-ENTMCNC: 33.3 G/DL (ref 31.5–36.5)
MCV RBC AUTO: 85 FL (ref 78–100)
PLATELET # BLD AUTO: 222 10E9/L (ref 150–450)
POTASSIUM SERPL-SCNC: 4.2 MMOL/L (ref 3.4–5.3)
PROT SERPL-MCNC: 7.8 G/DL (ref 6.8–8.8)
RBC # BLD AUTO: 4.89 10E12/L (ref 3.8–5.2)
SODIUM SERPL-SCNC: 139 MMOL/L (ref 133–144)
WBC # BLD AUTO: 6.7 10E9/L (ref 4–11)

## 2018-06-13 PROCEDURE — 99213 OFFICE O/P EST LOW 20 MIN: CPT | Performed by: INTERNAL MEDICINE

## 2018-06-13 PROCEDURE — 80053 COMPREHEN METABOLIC PANEL: CPT | Performed by: INTERNAL MEDICINE

## 2018-06-13 PROCEDURE — G0463 HOSPITAL OUTPT CLINIC VISIT: HCPCS

## 2018-06-13 PROCEDURE — 86300 IMMUNOASSAY TUMOR CA 15-3: CPT | Performed by: INTERNAL MEDICINE

## 2018-06-13 PROCEDURE — 36415 COLL VENOUS BLD VENIPUNCTURE: CPT

## 2018-06-13 PROCEDURE — 85027 COMPLETE CBC AUTOMATED: CPT | Performed by: INTERNAL MEDICINE

## 2018-06-13 ASSESSMENT — PAIN SCALES - GENERAL: PAINLEVEL: NO PAIN (0)

## 2018-06-13 NOTE — MR AVS SNAPSHOT
"              After Visit Summary   6/13/2018    Ashleigh Gandhi    MRN: [id number]           Patient Information     Date Of Birth          1970        Visit Information        Provider Department      6/13/2018 8:30 AM Donald Garvin MD [facility]        Today's Diagnoses     Malignant neoplasm of upper-outer quadrant of right breast in female, estrogen receptor positive (H)    -  1      Care Instructions        RTC MD 6 months     Labs today - drawn ljt  And on return           Follow-ups after your visit        Your next 10 appointments already scheduled     Dec 12, 2018 10:30 AM CST   Return Visit with Donald Garvin MD   [facility] ([facility])    [facility]  [address]  [facility] [address]  [facility] [address] [phone]              Who to contact     If you have questions or need follow up information about today's clinic visit or your schedule please contact [facility] directly at [phone].  Normal or non-critical lab and imaging results will be communicated to you by RetAPPshart, letter or phone within 4 business days after the clinic has received the results. If you do not hear from us within 7 days, please contact the clinic through [employer] or phone. If you have a critical or abnormal lab result, we will notify you by phone as soon as possible.  Submit refill requests through [employer] or call your pharmacy and they will forward the refill request to us. Please allow 3 business days for your refill to be completed.          Additional Information About Your Visit        RetAPPshart Information     [employer] lets you send messages to your doctor, view your test results, renew your prescriptions, schedule appointments and more. To sign up, go to [email or web address] . Click on \"Log in\" on the left side of the screen, which will take you to the Welcome page. Then click on " "\"Sign up Now\" on the right side of the page.     You will be asked to enter the access code listed below, as well as some personal information. Please follow the directions to create your username and password.     Your access code is: [email or web address]  Expires: 2018  9:29 AM     Your access code will  in 90 days. If you need help or a new code, please call your [place] clinic or [phone].        Care EveryWhere ID     This is your Care EveryWhere ID. This could be used by other organizations to access your [place] medical records  [id number]        Your Vitals Were     Pulse Temperature Respirations Height Pulse Oximetry BMI (Body Mass Index)    81 97.8  F (36.6  C) (Tympanic) 16 1.626 m (5' 4\") 97% 32.61 kg/m2       Blood Pressure from Last 3 Encounters:   18 108/72   17 118/81   17 116/74    Weight from Last 3 Encounters:   18 86.2 kg (190 lb)   17 86.7 kg (191 lb 3.2 oz)   17 83.6 kg (184 lb 6 oz)              We Performed the Following     Ca27.29  breast tumor marker     CBC with platelets     Comprehensive metabolic panel (BMP + Alb, Alk Phos, ALT, AST, Total. Bili, TP)        Primary Care Provider Office Phone # Fax #    Soraya Coronado [phone]       [place] [address]        Equal Access to Services     JYOTI MURPHY : Hadii alysia staleyo Somelissa, waaxda luqadaha, qaybta kaalmada adetaina, danii hinson. So [facility] [phone].    ATENCIÓN: Si habla español, tiene a bennett disposición servicios gratuitos de asistencia lingüística. Llame al [phone].    We comply with applicable federal civil rights laws and [place] laws. We do not discriminate on the basis of race, color, national origin, age, disability, sex, sexual orientation, or gender identity.            Thank you!     Thank you for choosing [facility]  for your care. Our goal is always to " provide you with excellent care. Hearing back from our patients is one way we can continue to improve our services. Please take a few minutes to complete the written survey that you may receive in the mail after your visit with us. Thank you!             Your Updated Medication List - Protect others around you: Learn how to safely use, store and throw away your medicines at www.disposemymeds.org.          This list is accurate as of 6/13/18  9:43 AM.  Always use your most recent med list.                   Brand Name Dispense Instructions for use Diagnosis    exemestane 25 MG tablet    AROMASIN    90 tablet    Take 1 tablet (25 mg) by mouth daily    Malignant neoplasm of right breast in female, estrogen receptor positive, unspecified site of breast (H)

## 2018-06-13 NOTE — PROGRESS NOTES
Visit Date:   06/13/2018      HISTORY OF PRESENT ILLNESS:  Ashleigh Gandhi is a 47-year-old patient who comes in today for interim followup.  She has a history of a right-sided breast cancer in the upper outer quadrant of the right breast.  It was a T1c N1 ductal carcinoma of the right breast.  She had 2 small tumors in the right breast and microscopic disease in a sentinel lymph node.  The stage of disease was a T1 N1, stage II.  The tumor was ER/AR-positive, HER-2 receptor negative.  She completed mastectomies, chemotherapy and is now on adjuvant hormonal therapy with Aromasin.  She has done 5 years of adjuvant tamoxifen.  Aromasin is going well for her.  She really does not have anything much in the way of bony aches with it and nothing much in the way of hot flashes.  She is up-to-date on a bone density, which I have reviewed also today.  She feels well today.  She battles with weight gain, and we have discussed weight gain again in clinic today.  She continues on an exercise program.  She continues to work full-time as a .      MEDICATIONS AND ALLERGIES:  Outlined on the nursing records.        REVIEW OF SYSTEMS:  The rest of her 14-point comprehensive review of systems, apart from what is outlined above, is unremarkable.      PHYSICAL EXAMINATION:   GENERAL:  She is a well-appearing lady in no acute distress.   VITAL SIGNS:  Stable.   HEENT:  Oropharynx clear, mucous membranes moist.   NECK:  No masses or goiter.  There is no cervical, supraclavicular or infraclavicular adenopathy.   CHEST:  Clear to auscultation and percussion bilaterally.   HEART:  S1, S2 normal.  No added sounds or murmurs.   ABDOMEN:  Soft and nontender, no hepatosplenomegaly.   EXTREMITIES:  Legs are without tenderness or edema.   BREASTS:  The patient is status post bilateral mastectomies.  The scars look good.  Right and left axillae are negative.  No palpable masses on the chest wall.   SKIN:  No rashes or lesions.   LYMPHATIC:   No evidence of lymphedema.      DATA REVIEW:  I have ordered routine blood work today.      IMPRESSION:  Patient with a history of a node positive right-sided breast cancer in the upper outer quadrant.  It was an infiltrating ductal carcinoma.  She had 2 small tumors in the right breast with microscopic disease in the sentinel lymph node, ER/AR-positive, HER-2 receptor negative.  She has finished bilateral mastectomies, chemotherapy, adjuvant tamoxifen and is now on active treatment with adjuvant Aromasin.  She is tolerating it well without any major side effects.  We have discussed the weight gain issue in clinic today.  She struggles with being able to shift weight.  I will review her labs when they come in and I will be in contact with her if there is anything sinister in the labs.         PJ DUPREE MD             D: 2018   T: 2018   MT: DEEPTHI      Name:     DEREK GIORDANO   MRN:      [id number]        Account:      [id number]   :      1970           Visit Date:   2018      Document: [id number]

## 2018-06-13 NOTE — NURSING NOTE
"Oncology Rooming Note    June 13, 2018 8:45 AM   Ashleigh Gandhi is a 47 year old female who presents for:    Chief Complaint   Patient presents with     Oncology Clinic Visit     Malignant neoplasm of right breast in female,      Initial Vitals: /72  Pulse 81  Temp 97.8  F (36.6  C) (Tympanic)  Resp 16  Ht 1.626 m (5' 4\")  Wt 86.2 kg (190 lb)  SpO2 97%  BMI 32.61 kg/m2 Estimated body mass index is 32.61 kg/(m^2) as calculated from the following:    Height as of this encounter: 1.626 m (5' 4\").    Weight as of this encounter: 86.2 kg (190 lb). Body surface area is 1.97 meters squared.  No Pain (0) Comment: Data Unavailable   No LMP recorded.  Allergies reviewed: Yes  Medications reviewed: Yes    Medications: Medication refills not needed today.  Pharmacy name entered into [employer]:    [id number] [place], [place] - [address] [facility]  [id number] [place], [place] - [address] [facility] [email or web address] HOME DELIVERY - [address]    Clinical concerns: follow up      8 minutes for nursing intake (face to face time)     Ashleigh Rothman CMA     DISCHARGE PLAN:  Next appointments: See patient instruction section  Departure Mode: Ambulatory  Accompanied by: self  0 minutes for nursing discharge (face to face time)   Ashleigh Rothman CMA                  "

## 2018-06-13 NOTE — LETTER
6/13/2018         RE: Ashleigh Gandhi  [address] [place] [address]        Dear Colleague,    Thank you for referring your patient, Ashleigh Gandhi, to the [facility]. Please see a copy of my visit note below.    Visit Date:   06/13/2018      HISTORY OF PRESENT ILLNESS:  Ashleigh Gandhi is a 47-year-old patient who comes in today for interim followup.  She has a history of a right-sided breast cancer in the upper outer quadrant of the right breast.  It was a T1c N1 ductal carcinoma of the right breast.  She had 2 small tumors in the right breast and microscopic disease in a sentinel lymph node.  The stage of disease was a T1 N1, stage II.  The tumor was ER/KS-positive, HER-2 receptor negative.  She completed mastectomies, chemotherapy and is now on adjuvant hormonal therapy with Aromasin.  She has done 5 years of adjuvant tamoxifen.  Aromasin is going well for her.  She really does not have anything much in the way of bony aches with it and nothing much in the way of hot flashes.  She is up-to-date on a bone density, which I have reviewed also today.  She feels well today.  She battles with weight gain, and we have discussed weight gain again in clinic today.  She continues on an exercise program.  She continues to work full-time as a .      MEDICATIONS AND ALLERGIES:  Outlined on the nursing records.        REVIEW OF SYSTEMS:  The rest of her 14-point comprehensive review of systems, apart from what is outlined above, is unremarkable.      PHYSICAL EXAMINATION:   GENERAL:  She is a well-appearing lady in no acute distress.   VITAL SIGNS:  Stable.   HEENT:  Oropharynx clear, mucous membranes moist.   NECK:  No masses or goiter.  There is no cervical, supraclavicular or infraclavicular adenopathy.   CHEST:  Clear to auscultation and percussion bilaterally.   HEART:  S1, S2 normal.  No added sounds or murmurs.   ABDOMEN:  Soft and nontender, no hepatosplenomegaly.    EXTREMITIES:  Legs are without tenderness or edema.   BREASTS:  The patient is status post bilateral mastectomies.  The scars look good.  Right and left axillae are negative.  No palpable masses on the chest wall.   SKIN:  No rashes or lesions.   LYMPHATIC:  No evidence of lymphedema.      DATA REVIEW:  I have ordered routine blood work today.      IMPRESSION:  Patient with a history of a node positive right-sided breast cancer in the upper outer quadrant.  It was an infiltrating ductal carcinoma.  She had 2 small tumors in the right breast with microscopic disease in the sentinel lymph node, ER/AR-positive, HER-2 receptor negative.  She has finished bilateral mastectomies, chemotherapy, adjuvant tamoxifen and is now on active treatment with adjuvant Aromasin.  She is tolerating it well without any major side effects.  We have discussed the weight gain issue in clinic today.  She struggles with being able to shift weight.  I will review her labs when they come in and I will be in contact with her if there is anything sinister in the labs.         PJ GARVIN MD             D: 2018   T: 2018   MT: DEEPTHI      Name:     DEREK GIORDANO   MRN:      [id number]        Account:      [id number]   :      1970           Visit Date:   2018      Document: [id number]       Again, thank you for allowing me to participate in the care of your patient.        Sincerely,        Pj Garvin MD

## 2018-12-12 ENCOUNTER — HOSPITAL ENCOUNTER (OUTPATIENT)
Facility: CLINIC | Age: 48
Setting detail: SPECIMEN
Discharge: HOME | End: 2018-12-12
Attending: INTERNAL MEDICINE | Admitting: INTERNAL MEDICINE
Payer: COMMERCIAL

## 2018-12-12 ENCOUNTER — ONCOLOGY VISIT (OUTPATIENT)
Dept: ONCOLOGY | Facility: CLINIC | Age: 48
End: 2018-12-12
Attending: INTERNAL MEDICINE
Payer: COMMERCIAL

## 2018-12-12 VITALS
HEIGHT: 64 IN | WEIGHT: 194 LBS | OXYGEN SATURATION: 97 % | TEMPERATURE: 97.3 F | SYSTOLIC BLOOD PRESSURE: 122 MMHG | DIASTOLIC BLOOD PRESSURE: 81 MMHG | HEART RATE: 92 BPM | BODY MASS INDEX: 33.12 KG/M2 | RESPIRATION RATE: 16 BRPM

## 2018-12-12 DIAGNOSIS — Z17.0 MALIGNANT NEOPLASM OF RIGHT BREAST IN FEMALE, ESTROGEN RECEPTOR POSITIVE, UNSPECIFIED SITE OF BREAST (H): Primary | ICD-10-CM

## 2018-12-12 DIAGNOSIS — C50.911 MALIGNANT NEOPLASM OF RIGHT BREAST IN FEMALE, ESTROGEN RECEPTOR POSITIVE, UNSPECIFIED SITE OF BREAST (H): Primary | ICD-10-CM

## 2018-12-12 LAB
ALBUMIN SERPL-MCNC: 4 G/DL (ref 3.4–5)
ALP SERPL-CCNC: 99 U/L (ref 40–150)
ALT SERPL W P-5'-P-CCNC: 28 U/L (ref 0–50)
ANION GAP SERPL CALCULATED.3IONS-SCNC: 3 MMOL/L (ref 3–14)
AST SERPL W P-5'-P-CCNC: 21 U/L (ref 0–45)
BILIRUB SERPL-MCNC: 0.3 MG/DL (ref 0.2–1.3)
BUN SERPL-MCNC: 11 MG/DL (ref 7–30)
CALCIUM SERPL-MCNC: 9 MG/DL (ref 8.5–10.1)
CANCER AG27-29 SERPL-ACNC: 25 U/ML (ref 0–39)
CHLORIDE SERPL-SCNC: 107 MMOL/L (ref 94–109)
CO2 SERPL-SCNC: 30 MMOL/L (ref 20–32)
CREAT SERPL-MCNC: 0.75 MG/DL (ref 0.52–1.04)
ERYTHROCYTE [DISTWIDTH] IN BLOOD BY AUTOMATED COUNT: 11.9 % (ref 10–15)
GFR SERPL CREATININE-BSD FRML MDRD: 82 ML/MIN/1.7M2
GLUCOSE SERPL-MCNC: 98 MG/DL (ref 70–99)
HCT VFR BLD AUTO: 43.8 % (ref 35–47)
HGB BLD-MCNC: 14.5 G/DL (ref 11.7–15.7)
MCH RBC QN AUTO: 28.4 PG (ref 26.5–33)
MCHC RBC AUTO-ENTMCNC: 33.1 G/DL (ref 31.5–36.5)
MCV RBC AUTO: 86 FL (ref 78–100)
PLATELET # BLD AUTO: 212 10E9/L (ref 150–450)
POTASSIUM SERPL-SCNC: 4.3 MMOL/L (ref 3.4–5.3)
PROT SERPL-MCNC: 7.7 G/DL (ref 6.8–8.8)
RBC # BLD AUTO: 5.1 10E12/L (ref 3.8–5.2)
SODIUM SERPL-SCNC: 140 MMOL/L (ref 133–144)
WBC # BLD AUTO: 5.3 10E9/L (ref 4–11)

## 2018-12-12 PROCEDURE — 86300 IMMUNOASSAY TUMOR CA 15-3: CPT | Performed by: INTERNAL MEDICINE

## 2018-12-12 PROCEDURE — 36415 COLL VENOUS BLD VENIPUNCTURE: CPT

## 2018-12-12 PROCEDURE — 80053 COMPREHEN METABOLIC PANEL: CPT | Performed by: INTERNAL MEDICINE

## 2018-12-12 PROCEDURE — 99214 OFFICE O/P EST MOD 30 MIN: CPT | Performed by: INTERNAL MEDICINE

## 2018-12-12 PROCEDURE — 85027 COMPLETE CBC AUTOMATED: CPT | Performed by: INTERNAL MEDICINE

## 2018-12-12 PROCEDURE — G0463 HOSPITAL OUTPT CLINIC VISIT: HCPCS

## 2018-12-12 RX ORDER — ANASTROZOLE 1 MG/1
1 TABLET ORAL DAILY
Qty: 90 TABLET | Refills: 3 | Status: SHIPPED | OUTPATIENT
Start: 2018-12-12 | End: 2019-12-16

## 2018-12-12 ASSESSMENT — MIFFLIN-ST. JEOR: SCORE: 1489.98

## 2018-12-12 ASSESSMENT — PAIN SCALES - GENERAL: PAINLEVEL: MODERATE PAIN (4)

## 2018-12-12 NOTE — LETTER
12/12/2018         RE: Ashleigh Gandhi  [address] [place] [address]        Dear Colleague,    Thank you for referring your patient, Ashleigh Gandhi, to the [facility]. Please see a copy of my visit note below.    Visit Date:   12/12/2018      HISTORY OF PRESENT ILLNESS:  Ashleigh Gandhi is a 48-year-old patient who comes in today for interim followup.  She has got a diagnosis of right-sided breast cancer that was located in the upper outer quadrant of the right breast.  It was a T1c N1 infiltrating ductal carcinoma of the right breast.  She has 2 small tumors and microscopic disease in a sentinel lymph node.  Her stage of disease was a T1 N1 stage II, ER/ID positive, HER-2 receptor negative.  She completed her mastectomies, chemotherapy and is now on adjuvant hormonal therapy with Aromasin.  She already completed at 5 years of tamoxifen.  She has got a few more years to do on the Aromasin.  She gets some hot flashes on and off with some fatigue, but otherwise she is doing quite well.  She continues to work full-time as a .      MEDICATIONS AND ALLERGIES:  Outlined in the nursing records.      REVIEW OF SYSTEMS:  A 14-point comprehensive review of systems is otherwise unremarkable.      PHYSICAL EXAMINATION:  She is a well appearing lady in no acute distress.   VITAL SIGNS:  Stable.     HEENT:  Oropharynx clear.  Mucous membranes moist.   NECK:  No masses or goiter.  There is no cervical, supraclavicular, infraclavicular adenopathy.   CHEST:  Clear to auscultation and percussion bilaterally.   HEART:  Sounds 1, 2 normal.  No added sounds, no murmurs.   ABDOMEN:  Soft and nontender.  No hepatosplenomegaly.     EXTREMITIES:  Legs are without tenderness or edema.     CHEST:  The patient is status post bilateral mastectomies.  The scars look good.  Right and left axillae are negative.   SKIN:  No rashes or lesions.   LYMPHATIC:  No evidence of lymphedema.      LABORATORY  DATA:  White cell count 5.3, hemoglobin 14.5, platelets 212.  Liver function tests within normal limits, creatinine 0.7.      IMPRESSION:  The patient with a history of a right-sided breast cancer in the upper outer quadrant of the right breast, estrogen and progesterone receptor positive, HER-2 receptor negative.  She did bilateral mastectomies, chemotherapy, 5 years of adjuvant tamoxifen and then switched over to Aromasin.  She does feel like the Aromasin is giving her intermittent bone achiness and she has been having difficulty with shifting her weight.  I have discussed with her today that one aromatase inhibitor may be better than another one with regard to side effect profile, so we are going to switch her out from the Aromasin over to Arimidex and see if that is any better.  I have written her a prescription for that today.  Otherwise, I think she is doing quite well.      Consultation time today has been 25 minutes.  The majority of time was spent on counseling and direction of the patient care.         PJ GARVIN MD             D: 2018   T: 2018   MT: GAYATHRI      Name:     DEREK GIORDANO   MRN:      [id number]        Account:      [id number]   :      1970           Visit Date:   2018      Document: [id number]       Again, thank you for allowing me to participate in the care of your patient.        Sincerely,        Pj Garvin MD

## 2018-12-12 NOTE — NURSING NOTE
"Oncology Rooming Note    December 12, 2018 10:51 AM   Ashleigh Gandhi is a 48 year old female who presents for:    Chief Complaint   Patient presents with     Oncology Clinic Visit     Breast cancer     Initial Vitals: /81   Pulse 92   Temp 97.3  F (36.3  C) (Oral)   Resp 16   Ht 1.626 m (5' 4\")   Wt 88 kg (194 lb)   SpO2 97%   BMI 33.30 kg/m   Estimated body mass index is 33.3 kg/m  as calculated from the following:    Height as of this encounter: 1.626 m (5' 4\").    Weight as of this encounter: 88 kg (194 lb). Body surface area is 1.99 meters squared.  Moderate Pain (4) Comment: Data Unavailable   No LMP recorded.  Allergies reviewed: Yes  Medications reviewed: Yes    Medications: Medication refills not needed today.  Pharmacy name entered into [employer]:    [id number] - [place], [place] - [address] [facility]  [id number] - [place], [place] - [address] [facility]  [facility]  FOR [place], [place] - [address] [place] [address]    Clinical concerns: follow up     8 minutes for nursing intake (face to face time)     Ashleigh Rothman CMA   DISCHARGE PLAN:  Next appointments: See patient instruction section  Departure Mode: Ambulatory  Accompanied by: self  0 minutes for nursing discharge (face to face time)   Ashleigh Rothman CMA              "

## 2018-12-25 NOTE — PROGRESS NOTES
Visit Date:   12/12/2018      HISTORY OF PRESENT ILLNESS:  Ashleigh Gandhi is a 48-year-old patient who comes in today for interim followup.  She has got a diagnosis of right-sided breast cancer that was located in the upper outer quadrant of the right breast.  It was a T1c N1 infiltrating ductal carcinoma of the right breast.  She has 2 small tumors and microscopic disease in a sentinel lymph node.  Her stage of disease was a T1 N1 stage II, ER/IN positive, HER-2 receptor negative.  She completed her mastectomies, chemotherapy and is now on adjuvant hormonal therapy with Aromasin.  She already completed at 5 years of tamoxifen.  She has got a few more years to do on the Aromasin.  She gets some hot flashes on and off with some fatigue, but otherwise she is doing quite well.  She continues to work full-time as a .      MEDICATIONS AND ALLERGIES:  Outlined in the nursing records.      REVIEW OF SYSTEMS:  A 14-point comprehensive review of systems is otherwise unremarkable.      PHYSICAL EXAMINATION:  She is a well appearing lady in no acute distress.   VITAL SIGNS:  Stable.     HEENT:  Oropharynx clear.  Mucous membranes moist.   NECK:  No masses or goiter.  There is no cervical, supraclavicular, infraclavicular adenopathy.   CHEST:  Clear to auscultation and percussion bilaterally.   HEART:  Sounds 1, 2 normal.  No added sounds, no murmurs.   ABDOMEN:  Soft and nontender.  No hepatosplenomegaly.     EXTREMITIES:  Legs are without tenderness or edema.     CHEST:  The patient is status post bilateral mastectomies.  The scars look good.  Right and left axillae are negative.   SKIN:  No rashes or lesions.   LYMPHATIC:  No evidence of lymphedema.      LABORATORY DATA:  White cell count 5.3, hemoglobin 14.5, platelets 212.  Liver function tests within normal limits, creatinine 0.7.      IMPRESSION:  The patient with a history of a right-sided breast cancer in the upper outer quadrant of the right breast, estrogen and  progesterone receptor positive, HER-2 receptor negative.  She did bilateral mastectomies, chemotherapy, 5 years of adjuvant tamoxifen and then switched over to Aromasin.  She does feel like the Aromasin is giving her intermittent bone achiness and she has been having difficulty with shifting her weight.  I have discussed with her today that one aromatase inhibitor may be better than another one with regard to side effect profile, so we are going to switch her out from the Aromasin over to Arimidex and see if that is any better.  I have written her a prescription for that today.  Otherwise, I think she is doing quite well.      Consultation time today has been 25 minutes.  The majority of time was spent on counseling and direction of the patient care.         PJ DUPREE MD             D: 2018   T: 2018   MT: GAYATHRI      Name:     DEREK GIORDANO   MRN:      [id number]        Account:      [id number]   :      1970           Visit Date:   2018      Document: [id number]

## 2019-06-19 ENCOUNTER — ONCOLOGY VISIT (OUTPATIENT)
Dept: ONCOLOGY | Facility: CLINIC | Age: 49
End: 2019-06-19
Attending: INTERNAL MEDICINE
Payer: COMMERCIAL

## 2019-06-19 ENCOUNTER — HOSPITAL ENCOUNTER (OUTPATIENT)
Facility: CLINIC | Age: 49
Setting detail: SPECIMEN
Discharge: HOME | End: 2019-06-19
Attending: INTERNAL MEDICINE | Admitting: INTERNAL MEDICINE
Payer: COMMERCIAL

## 2019-06-19 VITALS
BODY MASS INDEX: 33.7 KG/M2 | WEIGHT: 197.4 LBS | RESPIRATION RATE: 16 BRPM | TEMPERATURE: 97 F | DIASTOLIC BLOOD PRESSURE: 79 MMHG | SYSTOLIC BLOOD PRESSURE: 121 MMHG | OXYGEN SATURATION: 98 % | HEIGHT: 64 IN | HEART RATE: 76 BPM

## 2019-06-19 DIAGNOSIS — Z17.0 MALIGNANT NEOPLASM OF RIGHT BREAST IN FEMALE, ESTROGEN RECEPTOR POSITIVE, UNSPECIFIED SITE OF BREAST (H): Primary | ICD-10-CM

## 2019-06-19 DIAGNOSIS — C50.911 MALIGNANT NEOPLASM OF RIGHT BREAST IN FEMALE, ESTROGEN RECEPTOR POSITIVE, UNSPECIFIED SITE OF BREAST (H): Primary | ICD-10-CM

## 2019-06-19 LAB
ALBUMIN SERPL-MCNC: 4.1 G/DL (ref 3.4–5)
ALP SERPL-CCNC: 122 U/L (ref 40–150)
ALT SERPL W P-5'-P-CCNC: 51 U/L (ref 0–50)
ANION GAP SERPL CALCULATED.3IONS-SCNC: 3 MMOL/L (ref 3–14)
AST SERPL W P-5'-P-CCNC: 22 U/L (ref 0–45)
BILIRUB SERPL-MCNC: 0.3 MG/DL (ref 0.2–1.3)
BUN SERPL-MCNC: 12 MG/DL (ref 7–30)
CALCIUM SERPL-MCNC: 9 MG/DL (ref 8.5–10.1)
CANCER AG27-29 SERPL-ACNC: 20 U/ML (ref 0–39)
CHLORIDE SERPL-SCNC: 106 MMOL/L (ref 94–109)
CO2 SERPL-SCNC: 31 MMOL/L (ref 20–32)
CREAT SERPL-MCNC: 0.82 MG/DL (ref 0.52–1.04)
ERYTHROCYTE [DISTWIDTH] IN BLOOD BY AUTOMATED COUNT: 11.9 % (ref 10–15)
GFR SERPL CREATININE-BSD FRML MDRD: 83 ML/MIN/{1.73_M2}
GLUCOSE SERPL-MCNC: 99 MG/DL (ref 70–99)
HCT VFR BLD AUTO: 43.5 % (ref 35–47)
HGB BLD-MCNC: 14.4 G/DL (ref 11.7–15.7)
MCH RBC QN AUTO: 28.2 PG (ref 26.5–33)
MCHC RBC AUTO-ENTMCNC: 33.1 G/DL (ref 31.5–36.5)
MCV RBC AUTO: 85 FL (ref 78–100)
PLATELET # BLD AUTO: 245 10E9/L (ref 150–450)
POTASSIUM SERPL-SCNC: 4 MMOL/L (ref 3.4–5.3)
PROT SERPL-MCNC: 8.1 G/DL (ref 6.8–8.8)
RBC # BLD AUTO: 5.11 10E12/L (ref 3.8–5.2)
SODIUM SERPL-SCNC: 140 MMOL/L (ref 133–144)
WBC # BLD AUTO: 7.1 10E9/L (ref 4–11)

## 2019-06-19 PROCEDURE — 86300 IMMUNOASSAY TUMOR CA 15-3: CPT | Performed by: INTERNAL MEDICINE

## 2019-06-19 PROCEDURE — 80053 COMPREHEN METABOLIC PANEL: CPT | Performed by: INTERNAL MEDICINE

## 2019-06-19 PROCEDURE — 99214 OFFICE O/P EST MOD 30 MIN: CPT | Performed by: INTERNAL MEDICINE

## 2019-06-19 PROCEDURE — 36415 COLL VENOUS BLD VENIPUNCTURE: CPT

## 2019-06-19 PROCEDURE — G0463 HOSPITAL OUTPT CLINIC VISIT: HCPCS

## 2019-06-19 PROCEDURE — 85027 COMPLETE CBC AUTOMATED: CPT | Performed by: INTERNAL MEDICINE

## 2019-06-19 ASSESSMENT — PAIN SCALES - GENERAL: PAINLEVEL: NO PAIN (0)

## 2019-06-19 ASSESSMENT — MIFFLIN-ST. JEOR: SCORE: 1505.4

## 2019-06-19 NOTE — NURSING NOTE
"Oncology Rooming Note    June 19, 2019 3:55 PM   Ashleigh Gandhi is a 48 year old female who presents for:    Chief Complaint   Patient presents with     Oncology Clinic Visit     Malignant neoplasm of right breast in female     Initial Vitals: /79   Pulse 76   Temp 97  F (36.1  C) (Tympanic)   Resp 16   Ht 1.626 m (5' 4\")   Wt 89.5 kg (197 lb 6.4 oz)   SpO2 98%   BMI 33.88 kg/m   Estimated body mass index is 33.88 kg/m  as calculated from the following:    Height as of this encounter: 1.626 m (5' 4\").    Weight as of this encounter: 89.5 kg (197 lb 6.4 oz). Body surface area is 2.01 meters squared.  No Pain (0) Comment: Data Unavailable   No LMP recorded.  Allergies reviewed: Yes  Medications reviewed: Yes    Medications: Medication refills not needed today.  Pharmacy name entered into [employer]:    [id number] - [place], [place] - [address]  [id number] - [place], [place] - [address] [facility]  [facility] - [place], [place] - [address]    Clinical concerns: Follow Up       Latricia Noland CMA              "

## 2019-06-19 NOTE — LETTER
6/19/2019         RE: Ashleigh Gandhi  [address] [place] [address]        Dear Colleague,    Thank you for referring your patient, Ashleigh Gandhi, to the [facility]. Please see a copy of my visit note below.    Visit Date:   06/19/2019      HISTORY OF PRESENT ILLNESS:  Ashleigh Gandhi is a 48-year-old patient who comes in today for interim followup.  She has a diagnosis of right-sided breast cancer that was located in the upper outer quadrant of the right breast.  At her diagnosis, it was a T1c N1 infiltrating ductal carcinoma of the right breast.  She had 2 small tumors and microscopic disease in the sentinel lymph node.  She had a stage II disease, ER/NM positive, HER-2 receptor negative.  She completed out mastectomies, chemotherapy, and is now on adjuvant hormonal therapy.  She did initially 5 years of tamoxifen and is now on an aromatase inhibitor.  She started with Aromasin and then switched over to Arimidex.  She does get myalgias and arthralgias with the Arimidex, but she is planning to finish out a full 5 years if she can.  She has no worrisome symptomatology today.  She continues to work as a full-time .  She denies cough, shortness of breath, any worrisome bony discomfort.      MEDICATIONS AND ALLERGIES:  Outlined in the nursing records.      REVIEW OF SYSTEMS:  A 14-point comprehensive review of systems is otherwise unremarkable.      PHYSICAL EXAMINATION:   GENERAL:  She is a well-appearing lady in no acute distress.   VITAL SIGNS:  Stable.   HEENT:  Oropharynx clear, mucous membranes moist.   NECK:  Has no masses or goiter.  There is no cervical, supraclavicular or infraclavicular adenopathy.   CHEST:  Clear to auscultation and percussion bilaterally.   HEART:  S1, S2 normal.  No added sounds, no murmurs.   ABDOMEN:  Soft and nontender, no hepatosplenomegaly.   EXTREMITIES:  Legs are without tenderness or edema.   BREASTS:  The patient is status post  bilateral mastectomies,  Scars look good.  Right and left axillae are negative.   SKIN:  Has no rashes or lesions.   LYMPHATIC:  No evidence of lymphedema.      DATA REVIEW:  I have ordered today routine blood work, which is pending at the time of dictation.      ASSESSMENT AND PLAN:  Patient with a history of right-sided breast cancer in the upper outer quadrant of the right breast, ER/NY positive, HER-2 receptor negative.  She is status post bilateral mastectomies, chemotherapy, 5 years of adjuvant tamoxifen, and now on active treatment with adjuvant Arimidex.  The Arimidex is doing well, although she is having some myalgias and arthralgias with it.  I am going to get routine blood work on her today.  She is up  to date on a bone density, which was done in 10/2017.  We will do it again closer to the end of /early .  I will see her back in my clinic in 6 months.         PJ GARVIN MD             D: 2019   T: 2019   MT:       Name:     DEREK GIORDANO   MRN:      [id number]        Account:      [id number]   :      1970           Visit Date:   2019      Document: [id number]       Again, thank you for allowing me to participate in the care of your patient.        Sincerely,        Pj Garvin MD

## 2019-06-20 NOTE — PROGRESS NOTES
Visit Date:   06/19/2019      HISTORY OF PRESENT ILLNESS:  Ashleigh Gandhi is a 48-year-old patient who comes in today for interim followup.  She has a diagnosis of right-sided breast cancer that was located in the upper outer quadrant of the right breast.  At her diagnosis, it was a T1c N1 infiltrating ductal carcinoma of the right breast.  She had 2 small tumors and microscopic disease in the sentinel lymph node.  She had a stage II disease, ER/CA positive, HER-2 receptor negative.  She completed out mastectomies, chemotherapy, and is now on adjuvant hormonal therapy.  She did initially 5 years of tamoxifen and is now on an aromatase inhibitor.  She started with Aromasin and then switched over to Arimidex.  She does get myalgias and arthralgias with the Arimidex, but she is planning to finish out a full 5 years if she can.  She has no worrisome symptomatology today.  She continues to work as a full-time .  She denies cough, shortness of breath, any worrisome bony discomfort.      MEDICATIONS AND ALLERGIES:  Outlined in the nursing records.      REVIEW OF SYSTEMS:  A 14-point comprehensive review of systems is otherwise unremarkable.      PHYSICAL EXAMINATION:   GENERAL:  She is a well-appearing lady in no acute distress.   VITAL SIGNS:  Stable.   HEENT:  Oropharynx clear, mucous membranes moist.   NECK:  Has no masses or goiter.  There is no cervical, supraclavicular or infraclavicular adenopathy.   CHEST:  Clear to auscultation and percussion bilaterally.   HEART:  S1, S2 normal.  No added sounds, no murmurs.   ABDOMEN:  Soft and nontender, no hepatosplenomegaly.   EXTREMITIES:  Legs are without tenderness or edema.   BREASTS:  The patient is status post bilateral mastectomies,  Scars look good.  Right and left axillae are negative.   SKIN:  Has no rashes or lesions.   LYMPHATIC:  No evidence of lymphedema.      DATA REVIEW:  I have ordered today routine blood work, which is pending at the time of dictation.       ASSESSMENT AND PLAN:  Patient with a history of right-sided breast cancer in the upper outer quadrant of the right breast, ER/MA positive, HER-2 receptor negative.  She is status post bilateral mastectomies, chemotherapy, 5 years of adjuvant tamoxifen, and now on active treatment with adjuvant Arimidex.  The Arimidex is doing well, although she is having some myalgias and arthralgias with it.  I am going to get routine blood work on her today.  She is up  to date on a bone density, which was done in 10/2017.  We will do it again closer to the end of /early .  I will see her back in my clinic in 6 months.         PJ DUPREE MD             D: 2019   T: 2019   MT:       Name:     DEREK GIORDANO   MRN:      [id number]        Account:      [id number]   :      1970           Visit Date:   2019      Document: [id number]

## 2019-12-16 DIAGNOSIS — C50.911 MALIGNANT NEOPLASM OF RIGHT BREAST IN FEMALE, ESTROGEN RECEPTOR POSITIVE, UNSPECIFIED SITE OF BREAST (H): ICD-10-CM

## 2019-12-16 DIAGNOSIS — Z17.0 MALIGNANT NEOPLASM OF RIGHT BREAST IN FEMALE, ESTROGEN RECEPTOR POSITIVE, UNSPECIFIED SITE OF BREAST (H): ICD-10-CM

## 2019-12-16 RX ORDER — ANASTROZOLE 1 MG/1
1 TABLET ORAL DAILY
Qty: 90 TABLET | Refills: 3 | Status: SHIPPED | OUTPATIENT
Start: 2019-12-16 | End: 2020-04-17

## 2019-12-16 NOTE — TELEPHONE ENCOUNTER
Signed Prescriptions:                        Disp   Refills    anastrozole (ARIMIDEX) 1 MG tablet         90 tab*3        Sig: Take 1 tablet (1 mg) by mouth daily  Authorizing Provider: LIZABETH HAYES     Rx has been approved by RENATA Restrepo.  Joyce Hernandes, RN, BSN, OCN, CBCN

## 2019-12-16 NOTE — TELEPHONE ENCOUNTER
Pending Prescriptions:                       Disp   Refills    anastrozole (ARIMIDEX) 1 MG tablet        90 tab*3            Sig: Take 1 tablet (1 mg) by mouth daily         Last Written Prescription Date:  12/12/2018  Last Fill Quantity: 90,   # refills: 3  Last Office Visit: 6/19/2019  Future Office visit:    Next 5 appointments (look out 90 days)    Jan 08, 2020  3:30 PM CST  Return Visit with Donald Garvin MD  [facility] ([facility]) [facility]  [address]  [facility] [address]  [facility] [address]  [phone]           Routing refill request to provider for review/approval.  Joyce Hernandes, RN, BSN, OCN, CBCN

## 2020-02-05 ENCOUNTER — HOSPITAL ENCOUNTER (OUTPATIENT)
Facility: CLINIC | Age: 50
Setting detail: SPECIMEN
Discharge: HOME | End: 2020-02-05
Attending: INTERNAL MEDICINE | Admitting: INTERNAL MEDICINE
Payer: COMMERCIAL

## 2020-02-05 ENCOUNTER — ONCOLOGY VISIT (OUTPATIENT)
Dept: ONCOLOGY | Facility: CLINIC | Age: 50
End: 2020-02-05
Attending: INTERNAL MEDICINE
Payer: COMMERCIAL

## 2020-02-05 VITALS
RESPIRATION RATE: 16 BRPM | SYSTOLIC BLOOD PRESSURE: 118 MMHG | TEMPERATURE: 97.2 F | OXYGEN SATURATION: 100 % | WEIGHT: 198.5 LBS | HEART RATE: 79 BPM | BODY MASS INDEX: 34.07 KG/M2 | DIASTOLIC BLOOD PRESSURE: 77 MMHG

## 2020-02-05 DIAGNOSIS — Z17.0 MALIGNANT NEOPLASM OF UPPER-OUTER QUADRANT OF RIGHT BREAST IN FEMALE, ESTROGEN RECEPTOR POSITIVE (H): Primary | ICD-10-CM

## 2020-02-05 DIAGNOSIS — C50.411 MALIGNANT NEOPLASM OF UPPER-OUTER QUADRANT OF RIGHT BREAST IN FEMALE, ESTROGEN RECEPTOR POSITIVE (H): Primary | ICD-10-CM

## 2020-02-05 LAB
ALBUMIN SERPL-MCNC: 3.8 G/DL (ref 3.4–5)
ALP SERPL-CCNC: 97 U/L (ref 40–150)
ALT SERPL W P-5'-P-CCNC: 37 U/L (ref 0–50)
ANION GAP SERPL CALCULATED.3IONS-SCNC: 2 MMOL/L (ref 3–14)
AST SERPL W P-5'-P-CCNC: 27 U/L (ref 0–45)
BILIRUB SERPL-MCNC: 0.3 MG/DL (ref 0.2–1.3)
BUN SERPL-MCNC: 14 MG/DL (ref 7–30)
CALCIUM SERPL-MCNC: 9.2 MG/DL (ref 8.5–10.1)
CANCER AG27-29 SERPL-ACNC: 21 U/ML (ref 0–39)
CHLORIDE SERPL-SCNC: 107 MMOL/L (ref 94–109)
CO2 SERPL-SCNC: 31 MMOL/L (ref 20–32)
CREAT SERPL-MCNC: 0.76 MG/DL (ref 0.52–1.04)
ERYTHROCYTE [DISTWIDTH] IN BLOOD BY AUTOMATED COUNT: 12 % (ref 10–15)
GFR SERPL CREATININE-BSD FRML MDRD: >90 ML/MIN/{1.73_M2}
GLUCOSE SERPL-MCNC: 85 MG/DL (ref 70–99)
HCT VFR BLD AUTO: 43.1 % (ref 35–47)
HGB BLD-MCNC: 14 G/DL (ref 11.7–15.7)
MCH RBC QN AUTO: 27.9 PG (ref 26.5–33)
MCHC RBC AUTO-ENTMCNC: 32.5 G/DL (ref 31.5–36.5)
MCV RBC AUTO: 86 FL (ref 78–100)
PLATELET # BLD AUTO: 201 10E9/L (ref 150–450)
POTASSIUM SERPL-SCNC: 4.1 MMOL/L (ref 3.4–5.3)
PROT SERPL-MCNC: 7.4 G/DL (ref 6.8–8.8)
RBC # BLD AUTO: 5.01 10E12/L (ref 3.8–5.2)
SODIUM SERPL-SCNC: 140 MMOL/L (ref 133–144)
WBC # BLD AUTO: 6 10E9/L (ref 4–11)

## 2020-02-05 PROCEDURE — G0463 HOSPITAL OUTPT CLINIC VISIT: HCPCS

## 2020-02-05 PROCEDURE — 86300 IMMUNOASSAY TUMOR CA 15-3: CPT | Performed by: INTERNAL MEDICINE

## 2020-02-05 PROCEDURE — 80053 COMPREHEN METABOLIC PANEL: CPT | Performed by: INTERNAL MEDICINE

## 2020-02-05 PROCEDURE — 85027 COMPLETE CBC AUTOMATED: CPT | Performed by: INTERNAL MEDICINE

## 2020-02-05 PROCEDURE — 99214 OFFICE O/P EST MOD 30 MIN: CPT | Performed by: INTERNAL MEDICINE

## 2020-02-05 PROCEDURE — 36415 COLL VENOUS BLD VENIPUNCTURE: CPT

## 2020-02-05 RX ORDER — CETIRIZINE HYDROCHLORIDE 10 MG/1
10 TABLET ORAL DAILY
COMMUNITY

## 2020-02-05 ASSESSMENT — PAIN SCALES - GENERAL: PAINLEVEL: NO PAIN (0)

## 2020-02-05 NOTE — LETTER
2/5/2020         RE: Ashleigh Gandhi  [address] [place] [address]        Dear Colleague,    Thank you for referring your patient, Ashleigh Gandhi, to the [facility]. Please see a copy of my visit note below.    Visit Date:   02/05/2020      HISTORY OF PRESENT ILLNESS:  Ashleigh Gandhi is 49 years old, comes in today for interim followup.  She has a diagnosis of right-sided breast cancer located in the upper outer quadrant of the right breast.  At diagnosis, it was a T1c N1 infiltrating ductal carcinoma.  She had 2 small tumors and microscopic disease in the sentinel lymph node.  Stage of disease was stage 2, ER/SC positive, HER-2 receptor negative.  She completed bilateral mastectomies, chemotherapy and is now on adjuvant hormonal therapy.  She initially did 5 years of tamoxifen and is now currently on treatment with Arimidex.  The Arimidex dose still causes problems with hot flashes and some fatigue.  She is also trying to actively lose weight  currently, and she has been working out with a  and currently on a more healthy diet.  She continues to work full-time as a .  She denies cough, shortness of breath, bone pain, any worrisome symptomatology from my standpoint.      MEDICATIONS AND ALLERGIES:  Outlined in the nursing records.      REVIEW OF SYSTEMS:  A 14-point comprehensive review of systems is otherwise unremarkable.      PAIN ASSESSMENT:  The patient is in no pain today.      PHYSICAL EXAMINATION:   GENERAL:  She is well-appearing lady in no acute distress.   VITAL SIGNS:  Stable.   HEENT:  Oropharynx clear, mucous membranes moist.   NECK:  Has no masses or goiter.   NECK:  There is no cervical, supraclavicular or infraclavicular adenopathy.   CHEST:  Clear to auscultation and percussion bilaterally.   HEART:  Sounds 1 and 2 normal.  No added sounds, no murmurs.   BREASTS:  The patient is status post bilateral mastectomies.  The scars look good.  Right and left axilla  are negative.   SKIN:  Has no rashes or lesions.   NEUROLOGIC:  Peripheral neurological exam grossly intact.  There is no evidence of lymphedema.      DATA REVIEW:  White cell count 6.0, hemoglobin 14.0, platelets 201, creatinine 0.76.  Breast cancer marker.  Normal liver function tests within normal limits.      IMPRESSION:  Patient with a history of right-sided breast cancer in the upper outer quadrant of the right breast, ER/TX positive, HER-2 receptor negative.  She is status post bilateral mastectomies, chemotherapy.  She has had 5 years of adjuvant tamoxifen and is now on active treatment with adjuvant Arimidex.  She is getting some side effects to it with myalgias, arthralgias, and fatigue.  She is also battling some weight issues and is on a diet and exercise program.  We have discussed this in clinic today.  She is doing well from a breast cancer standpoint.  I will see her back in my clinic in 6 months.      CONSULTATION TIME:  30 minutes, the majority of time was spent on counseling and direction of patient care.         PJ GARVIN MD             D: 2020   T: 2020   MT: DINO      Name:     DEREK GIORDANO   MRN:      [id number]        Account:      [id number]   :      1970           Visit Date:   2020      Document: [id number]       Again, thank you for allowing me to participate in the care of your patient.        Sincerely,        Pj Garvin MD

## 2020-02-05 NOTE — NURSING NOTE
"Oncology Rooming Note    February 5, 2020 11:38 AM   Ashleigh Gandhi is a 49 year old female who presents for:    Chief Complaint   Patient presents with     Oncology Clinic Visit     Malignant neoplasm of right breast in female,      Initial Vitals: /77   Pulse 79   Temp 97.2  F (36.2  C) (Tympanic)   Resp 16   Wt 90 kg (198 lb 8 oz)   SpO2 100%   BMI 34.07 kg/m   Estimated body mass index is 34.07 kg/m  as calculated from the following:    Height as of 6/19/19: 1.626 m (5' 4\").    Weight as of this encounter: 90 kg (198 lb 8 oz). Body surface area is 2.02 meters squared.  No Pain (0) Comment: Data Unavailable   No LMP recorded.  Allergies reviewed: Yes  Medications reviewed: Yes    Medications: Medication refills not needed today.  Pharmacy name entered into [employer]:    [id number] - [place], [place] - [address]  [id number] - [place] - [address]  [facility], [place] - [facility] [facility]    Clinical concerns: follow up       Ashleigh Rothman CMA              "

## 2020-02-07 NOTE — PROGRESS NOTES
Visit Date:   02/05/2020      HISTORY OF PRESENT ILLNESS:  Ashleigh Gandhi is 49 years old, comes in today for interim followup.  She has a diagnosis of right-sided breast cancer located in the upper outer quadrant of the right breast.  At diagnosis, it was a T1c N1 infiltrating ductal carcinoma.  She had 2 small tumors and microscopic disease in the sentinel lymph node.  Stage of disease was stage 2, ER/AR positive, HER-2 receptor negative.  She completed bilateral mastectomies, chemotherapy and is now on adjuvant hormonal therapy.  She initially did 5 years of tamoxifen and is now currently on treatment with Arimidex.  The Arimidex dose still causes problems with hot flashes and some fatigue.  She is also trying to actively lose weight  currently, and she has been working out with a  and currently on a more healthy diet.  She continues to work full-time as a .  She denies cough, shortness of breath, bone pain, any worrisome symptomatology from my standpoint.      MEDICATIONS AND ALLERGIES:  Outlined in the nursing records.      REVIEW OF SYSTEMS:  A 14-point comprehensive review of systems is otherwise unremarkable.      PAIN ASSESSMENT:  The patient is in no pain today.      PHYSICAL EXAMINATION:   GENERAL:  She is well-appearing lady in no acute distress.   VITAL SIGNS:  Stable.   HEENT:  Oropharynx clear, mucous membranes moist.   NECK:  Has no masses or goiter.   NECK:  There is no cervical, supraclavicular or infraclavicular adenopathy.   CHEST:  Clear to auscultation and percussion bilaterally.   HEART:  Sounds 1 and 2 normal.  No added sounds, no murmurs.   BREASTS:  The patient is status post bilateral mastectomies.  The scars look good.  Right and left axilla are negative.   SKIN:  Has no rashes or lesions.   NEUROLOGIC:  Peripheral neurological exam grossly intact.  There is no evidence of lymphedema.      DATA REVIEW:  White cell count 6.0, hemoglobin 14.0, platelets 201, creatinine 0.76.   Breast cancer marker.  Normal liver function tests within normal limits.      IMPRESSION:  Patient with a history of right-sided breast cancer in the upper outer quadrant of the right breast, ER/MS positive, HER-2 receptor negative.  She is status post bilateral mastectomies, chemotherapy.  She has had 5 years of adjuvant tamoxifen and is now on active treatment with adjuvant Arimidex.  She is getting some side effects to it with myalgias, arthralgias, and fatigue.  She is also battling some weight issues and is on a diet and exercise program.  We have discussed this in clinic today.  She is doing well from a breast cancer standpoint.  I will see her back in my clinic in 6 months.      CONSULTATION TIME:  30 minutes, the majority of time was spent on counseling and direction of patient care.         PJ DUPREE MD             D: 2020   T: 2020   MT: DINO      Name:     DEREK GIORDANO   MRN:      [id number]        Account:      [id number]   :      1970           Visit Date:   2020      Document: [id number]

## 2020-03-10 ENCOUNTER — HEALTH MAINTENANCE LETTER (OUTPATIENT)
Age: 50
End: 2020-03-10

## 2020-04-17 DIAGNOSIS — C50.911 MALIGNANT NEOPLASM OF RIGHT BREAST IN FEMALE, ESTROGEN RECEPTOR POSITIVE, UNSPECIFIED SITE OF BREAST (H): ICD-10-CM

## 2020-04-17 DIAGNOSIS — Z17.0 MALIGNANT NEOPLASM OF RIGHT BREAST IN FEMALE, ESTROGEN RECEPTOR POSITIVE, UNSPECIFIED SITE OF BREAST (H): ICD-10-CM

## 2020-04-17 RX ORDER — ANASTROZOLE 1 MG/1
1 TABLET ORAL DAILY
Qty: 90 TABLET | Refills: 2 | Status: SHIPPED | OUTPATIENT
Start: 2020-04-17 | End: 2020-09-15

## 2020-04-17 NOTE — TELEPHONE ENCOUNTER
New rx request from [facility] for patient to change mail order pharmacies for her anastrozole. Rx was just prescribed 12/2019 and sent to [facility] . Writer routing refill request to Dr. Garvin for one less refill to [facility].  Tri Epperson RN, BSN, [facility]  Patient Care Coordinator  [facility]  [phone]

## 2020-04-17 NOTE — TELEPHONE ENCOUNTER
Pending Prescriptions:                       Disp   Refills    anastrozole (ARIMIDEX) 1 MG tablet        90 tab*2            Sig: Take 1 tablet (1 mg) by mouth daily          Last Written Prescription Date:  12/16/2019  Last Fill Quantity: 90,   # refills: 3  Last Office Visit: 02/05/2020  Future Office visit:       Routing refill request to provider    Tri Epperson RN, BSN, [facility]  Patient Care Coordinator  [facility]  [phone]

## 2020-08-31 ENCOUNTER — PATIENT OUTREACH (OUTPATIENT)
Dept: ONCOLOGY | Facility: CLINIC | Age: 50
End: 2020-08-31

## 2020-08-31 NOTE — PROGRESS NOTES
Refill request from [facility] received. Writer discussed with patient who reports having 3 months worth at home. Script was faxed back to deny refill at this time.  Mere Lan RN on 8/31/2020 at 1:24 PM

## 2020-09-15 DIAGNOSIS — Z17.0 MALIGNANT NEOPLASM OF RIGHT BREAST IN FEMALE, ESTROGEN RECEPTOR POSITIVE, UNSPECIFIED SITE OF BREAST (H): ICD-10-CM

## 2020-09-15 DIAGNOSIS — C50.911 MALIGNANT NEOPLASM OF RIGHT BREAST IN FEMALE, ESTROGEN RECEPTOR POSITIVE, UNSPECIFIED SITE OF BREAST (H): ICD-10-CM

## 2020-09-15 RX ORDER — ANASTROZOLE 1 MG/1
1 TABLET ORAL DAILY
Qty: 90 TABLET | Refills: 3 | Status: SHIPPED | OUTPATIENT
Start: 2020-09-15 | End: 2021-05-24

## 2020-09-23 ENCOUNTER — HOSPITAL ENCOUNTER (OUTPATIENT)
Facility: CLINIC | Age: 50
Setting detail: SPECIMEN
End: 2020-09-23
Attending: INTERNAL MEDICINE
Payer: COMMERCIAL

## 2020-09-23 ENCOUNTER — ONCOLOGY VISIT (OUTPATIENT)
Dept: ONCOLOGY | Facility: CLINIC | Age: 50
End: 2020-09-23
Attending: INTERNAL MEDICINE
Payer: COMMERCIAL

## 2020-09-23 ENCOUNTER — HOSPITAL ENCOUNTER (OUTPATIENT)
Facility: CLINIC | Age: 50
Setting detail: SPECIMEN
Discharge: HOME | End: 2020-09-23
Attending: INTERNAL MEDICINE | Admitting: INTERNAL MEDICINE
Payer: COMMERCIAL

## 2020-09-23 VITALS
WEIGHT: 196 LBS | TEMPERATURE: 97.2 F | RESPIRATION RATE: 16 BRPM | OXYGEN SATURATION: 94 % | SYSTOLIC BLOOD PRESSURE: 123 MMHG | BODY MASS INDEX: 33.64 KG/M2 | DIASTOLIC BLOOD PRESSURE: 73 MMHG | HEART RATE: 102 BPM

## 2020-09-23 DIAGNOSIS — Z17.0 MALIGNANT NEOPLASM OF RIGHT BREAST IN FEMALE, ESTROGEN RECEPTOR POSITIVE, UNSPECIFIED SITE OF BREAST (H): Primary | ICD-10-CM

## 2020-09-23 DIAGNOSIS — C50.911 MALIGNANT NEOPLASM OF RIGHT BREAST IN FEMALE, ESTROGEN RECEPTOR POSITIVE, UNSPECIFIED SITE OF BREAST (H): Primary | ICD-10-CM

## 2020-09-23 LAB
ALBUMIN SERPL-MCNC: 3.8 G/DL (ref 3.4–5)
ALP SERPL-CCNC: 106 U/L (ref 40–150)
ALT SERPL W P-5'-P-CCNC: 63 U/L (ref 0–50)
ANION GAP SERPL CALCULATED.3IONS-SCNC: 4 MMOL/L (ref 3–14)
AST SERPL W P-5'-P-CCNC: 34 U/L (ref 0–45)
BILIRUB SERPL-MCNC: 0.6 MG/DL (ref 0.2–1.3)
BUN SERPL-MCNC: 17 MG/DL (ref 7–30)
CALCIUM SERPL-MCNC: 9.2 MG/DL (ref 8.5–10.1)
CANCER AG27-29 SERPL-ACNC: 29 U/ML (ref 0–39)
CHLORIDE SERPL-SCNC: 106 MMOL/L (ref 94–109)
CO2 SERPL-SCNC: 30 MMOL/L (ref 20–32)
CREAT SERPL-MCNC: 0.78 MG/DL (ref 0.52–1.04)
ERYTHROCYTE [DISTWIDTH] IN BLOOD BY AUTOMATED COUNT: 12.8 % (ref 10–15)
GFR SERPL CREATININE-BSD FRML MDRD: 88 ML/MIN/{1.73_M2}
GLUCOSE SERPL-MCNC: 98 MG/DL (ref 70–99)
HCT VFR BLD AUTO: 42.5 % (ref 35–47)
HGB BLD-MCNC: 13.5 G/DL (ref 11.7–15.7)
MCH RBC QN AUTO: 27.7 PG (ref 26.5–33)
MCHC RBC AUTO-ENTMCNC: 31.8 G/DL (ref 31.5–36.5)
MCV RBC AUTO: 87 FL (ref 78–100)
PLATELET # BLD AUTO: 190 10E9/L (ref 150–450)
POTASSIUM SERPL-SCNC: 4.5 MMOL/L (ref 3.4–5.3)
PROT SERPL-MCNC: 7.9 G/DL (ref 6.8–8.8)
RBC # BLD AUTO: 4.88 10E12/L (ref 3.8–5.2)
SODIUM SERPL-SCNC: 140 MMOL/L (ref 133–144)
WBC # BLD AUTO: 4.8 10E9/L (ref 4–11)

## 2020-09-23 PROCEDURE — G0463 HOSPITAL OUTPT CLINIC VISIT: HCPCS

## 2020-09-23 PROCEDURE — 99214 OFFICE O/P EST MOD 30 MIN: CPT | Performed by: INTERNAL MEDICINE

## 2020-09-23 PROCEDURE — 86300 IMMUNOASSAY TUMOR CA 15-3: CPT | Performed by: INTERNAL MEDICINE

## 2020-09-23 PROCEDURE — 80053 COMPREHEN METABOLIC PANEL: CPT | Performed by: INTERNAL MEDICINE

## 2020-09-23 PROCEDURE — 85027 COMPLETE CBC AUTOMATED: CPT | Performed by: INTERNAL MEDICINE

## 2020-09-23 PROCEDURE — 36415 COLL VENOUS BLD VENIPUNCTURE: CPT

## 2020-09-23 RX ORDER — OMEGA-3/DHA/EPA/FISH OIL 60 MG-90MG
CAPSULE ORAL
COMMUNITY

## 2020-09-23 RX ORDER — LACTOBACILLUS ACIDOPHILUS/PECT 30 MG-20MG
TABLET ORAL
COMMUNITY

## 2020-09-23 ASSESSMENT — PAIN SCALES - GENERAL: PAINLEVEL: NO PAIN (0)

## 2020-09-23 NOTE — LETTER
"    9/23/2020         RE: Ashleigh Gandhi  [address] [place] [address]        Dear Colleague,    Thank you for referring your patient, Ashleigh Gandhi, to the [facility]. Please see a copy of my visit note below.    Oncology Rooming Note    September 23, 2020 8:40 AM   Ashleigh Gandhi is a 49 year old female who presents for:    Chief Complaint   Patient presents with     Oncology Clinic Visit     Malignant neoplasm of upper-outer quadrant of right breast in female     Initial Vitals: /73   Pulse 102   Temp 97.2  F (36.2  C) (Tympanic)   Resp 16   Wt 88.9 kg (196 lb)   SpO2 94%   BMI 33.64 kg/m   Estimated body mass index is 33.64 kg/m  as calculated from the following:    Height as of 6/19/19: 1.626 m (5' 4\").    Weight as of this encounter: 88.9 kg (196 lb). Body surface area is 2 meters squared.  No Pain (0) Comment: Data Unavailable   No LMP recorded.  Allergies reviewed: {ALLERGIES:890563}  Medications reviewed: Yes    Medications: Medication refills not needed today.  Pharmacy name entered into [employer]:    [id number] - [place], [place] - [address] [facility]  [id number] - [place], [place] - [address] [facility]  [facility]  FOR [address]  [facility] - [address]    Clinical concerns: follow up       Ashleigh Rothman [facility]                Visit Date:   09/23/2020      HISTORY OF PRESENT ILLNESS:  Ashleigh Gandhi is a 49-year-old patient who is here today for interim followup.  She has a diagnosis of right-sided breast cancer.      HISTORY OF PRESENTING COMPLAINT:  Ashleigh has a diagnosis of right-sided breast cancer in the upper outer quadrant of the right breast.  It was a T1c N1 infiltrating ductal carcinoma, and she had 2 small tumors with microscopic disease in a sentinel lymph node.  Her stage of disease at diagnosis was a stage IIA, ER/[place] positive, HER-2 receptor negative.  She is now 8 years out " from her diagnosis.  She completed bilateral mastectomies, chemotherapy, then did 5 years of tamoxifen and is now on Arimidex.  Arimidex is going well.  She does get fatigued from it.  She also gets joint pain.  We have discussed the joint pain at length today.  I have given her some tips on how to decrease the joint pain.  She is due for a bone density and also some labs today.  She is starting to work out more and be active more.  We have talked about diet and exercise today.  She denies cough, shortness of breath, any worrisome symptomatology.      REVIEW OF SYSTEMS:  A 14-point comprehensive review of systems is otherwise unremarkable.      MEDICATIONS AND ALLERGIES:  Outlined in the nursing records.      PAIN ASSESSMENT:  She has intermittent pain in her bones from the aromatase inhibitor.      SOCIAL HISTORY:  The patient works full-time as a .  She is a nonsmoker.  She has 1 son.      PHYSICAL EXAMINATION:   GENERAL:  She is a well-appearing lady in no acute distress.   VITAL SIGNS:  Stable.   HEENT:  Oropharynx clear, mucous membranes moist.   NECK:  Has no masses or goiter.   NECK:  There is no cervical, supraclavicular or infraclavicular adenopathy.   CHEST:  Clear to auscultation and percussion bilaterally.   HEART:  Sounds 1, 2 normal.  No added sounds, no murmurs.   BREASTS:  The patient is status post bilateral mastectomies.  The scars look good.  Right and left axillae are negative.   SKIN:  Has no rashes or lesions.   NEUROLOGIC:  Peripheral neurological exam grossly intact.      DATA REVIEW:  White cell count 4.8, hemoglobin 13.5, platelets 190, creatinine 0.78.      IMPRESSION:  Patient with a history of right-sided breast cancer in the upper outer quadrant of the right breast, ER/OR positive, HER-2 receptor negative.  She is status post bilateral mastectomies, chemotherapy, has had 5 years of adjuvant tamoxifen and is now on adjuvant Arimidex.  She has about another 2 years to go.  She does  get myalgias and arthralgias, as well as fatigue from it.  She has been battling some weight issues, and we have discussed diet and exercise today.  She is starting any new exercise program soon.  Overall, from a breast cancer standpoint, she seems to be doing well.  I will check her breast cancer marker, and I will see her back in my clinic in 6 months.      CONSULTATION TIME:  25 minutes; the majority of time was spent on counseling and direction of patient care.         PJ GARVIN MD             D: 2020   T: 2020   MT:       Name:     DEREK GIORDANO   MRN:      [id number]        Account:      [id number]   :      1970           Visit Date:   2020      Document: [id number]       Again, thank you for allowing me to participate in the care of your patient.        Sincerely,        Pj Garvin MD

## 2020-09-23 NOTE — PROGRESS NOTES
"Oncology Rooming Note    September 23, 2020 8:40 AM   Ashleigh Gandhi is a 49 year old female who presents for:    Chief Complaint   Patient presents with     Oncology Clinic Visit     Malignant neoplasm of upper-outer quadrant of right breast in female     Initial Vitals: /73   Pulse 102   Temp 97.2  F (36.2  C) (Tympanic)   Resp 16   Wt 88.9 kg (196 lb)   SpO2 94%   BMI 33.64 kg/m   Estimated body mass index is 33.64 kg/m  as calculated from the following:    Height as of 6/19/19: 1.626 m (5' 4\").    Weight as of this encounter: 88.9 kg (196 lb). Body surface area is 2 meters squared.  No Pain (0) Comment: Data Unavailable   No LMP recorded.  Allergies reviewed:   Medications reviewed: Yes    Medications: Medication refills not needed today.  Pharmacy name entered into [employer]:    [id number] - [place], [place] - [address] [facility]  [id number] - [place], [place] - [address] [facility]  [facility] [place] - [address] [place] [address]  [facility] - [place], [place] - [address] [place] [address]    Clinical concerns: follow up       Ashleigh Rothman CMA              "

## 2020-09-23 NOTE — PROGRESS NOTES
Visit Date:   09/23/2020      HISTORY OF PRESENT ILLNESS:  Ashleigh Gandhi is a 49-year-old patient who is here today for interim followup.  She has a diagnosis of right-sided breast cancer.      HISTORY OF PRESENTING COMPLAINT:  Ashleigh has a diagnosis of right-sided breast cancer in the upper outer quadrant of the right breast.  It was a T1c N1 infiltrating ductal carcinoma, and she had 2 small tumors with microscopic disease in a sentinel lymph node.  Her stage of disease at diagnosis was a stage IIA, ER/[place] positive, HER-2 receptor negative.  She is now 8 years out from her diagnosis.  She completed bilateral mastectomies, chemotherapy, then did 5 years of tamoxifen and is now on Arimidex.  Arimidex is going well.  She does get fatigued from it.  She also gets joint pain.  We have discussed the joint pain at length today.  I have given her some tips on how to decrease the joint pain.  She is due for a bone density and also some labs today.  She is starting to work out more and be active more.  We have talked about diet and exercise today.  She denies cough, shortness of breath, any worrisome symptomatology.      REVIEW OF SYSTEMS:  A 14-point comprehensive review of systems is otherwise unremarkable.      MEDICATIONS AND ALLERGIES:  Outlined in the nursing records.      PAIN ASSESSMENT:  She has intermittent pain in her bones from the aromatase inhibitor.      SOCIAL HISTORY:  The patient works full-time as a .  She is a nonsmoker.  She has 1 son.      PHYSICAL EXAMINATION:   GENERAL:  She is a well-appearing lady in no acute distress.   VITAL SIGNS:  Stable.   HEENT:  Oropharynx clear, mucous membranes moist.   NECK:  Has no masses or goiter.   NECK:  There is no cervical, supraclavicular or infraclavicular adenopathy.   CHEST:  Clear to auscultation and percussion bilaterally.   HEART:  Sounds 1, 2 normal.  No added sounds, no murmurs.   BREASTS:  The patient is status post bilateral mastectomies.  The scars  look good.  Right and left axillae are negative.   SKIN:  Has no rashes or lesions.   NEUROLOGIC:  Peripheral neurological exam grossly intact.      DATA REVIEW:  White cell count 4.8, hemoglobin 13.5, platelets 190, creatinine 0.78.      IMPRESSION:  Patient with a history of right-sided breast cancer in the upper outer quadrant of the right breast, ER/SD positive, HER-2 receptor negative.  She is status post bilateral mastectomies, chemotherapy, has had 5 years of adjuvant tamoxifen and is now on adjuvant Arimidex.  She has about another 2 years to go.  She does get myalgias and arthralgias, as well as fatigue from it.  She has been battling some weight issues, and we have discussed diet and exercise today.  She is starting any new exercise program soon.  Overall, from a breast cancer standpoint, she seems to be doing well.  I will check her breast cancer marker, and I will see her back in my clinic in 6 months.      CONSULTATION TIME:  25 minutes; the majority of time was spent on counseling and direction of patient care.         PJ DUPREE MD             D: 2020   T: 2020   MT:       Name:     DEREK GIORDANO   MRN:      [id number]        Account:      [id number]   :      1970           Visit Date:   2020      Document: [id number]

## 2020-12-27 ENCOUNTER — HEALTH MAINTENANCE LETTER (OUTPATIENT)
Age: 50
End: 2020-12-27

## 2021-03-25 ENCOUNTER — IMMUNIZATION (OUTPATIENT)
Dept: NURSING | Facility: CLINIC | Age: 51
End: 2021-03-25
Payer: COMMERCIAL

## 2021-03-25 PROCEDURE — 0031A PR COVID VAC JANSSEN AD26 0.5ML: CPT

## 2021-03-25 PROCEDURE — 91303 PR COVID VAC JANSSEN AD26 0.5ML: CPT

## 2021-04-24 ENCOUNTER — HEALTH MAINTENANCE LETTER (OUTPATIENT)
Age: 51
End: 2021-04-24

## 2021-05-24 DIAGNOSIS — Z17.0 MALIGNANT NEOPLASM OF RIGHT BREAST IN FEMALE, ESTROGEN RECEPTOR POSITIVE, UNSPECIFIED SITE OF BREAST (H): ICD-10-CM

## 2021-05-24 DIAGNOSIS — C50.911 MALIGNANT NEOPLASM OF RIGHT BREAST IN FEMALE, ESTROGEN RECEPTOR POSITIVE, UNSPECIFIED SITE OF BREAST (H): ICD-10-CM

## 2021-05-24 RX ORDER — ANASTROZOLE 1 MG/1
1 TABLET ORAL DAILY
Qty: 90 TABLET | Refills: 3 | Status: SHIPPED | OUTPATIENT
Start: 2021-05-24

## 2021-05-24 NOTE — TELEPHONE ENCOUNTER
ARIMIDEX 1MG Tablet  Last Written Prescription Date:  9/15/2020  Last Fill Quantity: 90,   # refills: 3  Last Office Visit: 9/23/2020  Future Office visit:       Routing refill request to provider for review/approval.    Pending Prescriptions:                       Disp   Refills    anastrozole (ARIMIDEX) 1 MG tablet        90 tab*3            Sig: Take 1 tablet (1 mg) by mouth daily

## 2021-10-04 ENCOUNTER — HEALTH MAINTENANCE LETTER (OUTPATIENT)
Age: 51
End: 2021-10-04

## 2022-03-24 NOTE — PATIENT INSTRUCTIONS
I have a hold on Dr Garvin's scheduled for Wednesday June 19, 2019 at 3:30 for her follow up appointment.  Due to scheduling restrictions I am unable to schedule at this time.  When able, it will be scheduled for this patient.   AVS given to patient. Neida KEYS   details…

## 2022-05-15 ENCOUNTER — HEALTH MAINTENANCE LETTER (OUTPATIENT)
Age: 52
End: 2022-05-15

## 2022-09-11 ENCOUNTER — HEALTH MAINTENANCE LETTER (OUTPATIENT)
Age: 52
End: 2022-09-11

## 2022-11-21 NOTE — TELEPHONE ENCOUNTER
Pending Prescriptions:                       Disp   Refills    anastrozole (ARIMIDEX) 1 MG tablet        90 tab*3            Sig: Take 1 tablet (1 mg) by mouth daily         Last Written Prescription Date:  4/17/2020  Last Fill Quantity: 90,   # refills: 2  Last Office Visit: 2/5/2020  Future Office visit:    Next 5 appointments (look out 90 days)    Sep 23, 2020  8:30 AM CDT  Return Visit with Donald Garvin MD  [facility] ([facility]) [address]  [address]  [facility] [address] [phone]         Called to talk with [facility]. An Rx for the arimidex was sent in on 4/17/2020, quantity of 90 with 2 refills.   [facility] states that pt's insurance has allowed her to fill the most recent Rx for a quantity of 102 tablets. Therefore, pt does not have a full refill left, and will need it for her next refill.   Routing refill request to provider for review/approval.  Joyce Hernandes, RN, BSN, OCN, CBCN    
Signed Prescriptions:                        Disp   Refills    anastrozole (ARIMIDEX) 1 MG tablet         90 tab*3        Sig: Take 1 tablet (1 mg) by mouth daily  Authorizing Provider: PJ GARVIN     Rx has been approved by Dr. Garvin.  Joyce Hernandes, RN, BSN, OCN, CBCN    
You can access the FollowMyHealth Patient Portal offered by [facility] by registering at the following website: [email or web address]. By joining [facility]’s FollowMyHealth portal, you will also be able to view your health information using other applications (apps) compatible with our system.

## 2023-06-03 ENCOUNTER — HEALTH MAINTENANCE LETTER (OUTPATIENT)
Age: 53
End: 2023-06-03
